# Patient Record
Sex: FEMALE | Race: WHITE | NOT HISPANIC OR LATINO | Employment: FULL TIME | URBAN - METROPOLITAN AREA
[De-identification: names, ages, dates, MRNs, and addresses within clinical notes are randomized per-mention and may not be internally consistent; named-entity substitution may affect disease eponyms.]

---

## 2019-03-14 LAB
EXTERNAL HEPATITIS B SURFACE ANTIGEN: NEGATIVE
EXTERNAL HEPATITIS C AB: NEGATIVE
EXTERNAL RUBELLA QUALITATIVE: NORMAL
EXTERNAL URINE DRUG SCREEN: NEGATIVE
HIV1 P24 AG SERPL QL IA: NORMAL

## 2019-10-02 ENCOUNTER — LAB (OUTPATIENT)
Dept: LAB | Facility: HOSPITAL | Age: 39
End: 2019-10-02

## 2019-10-02 ENCOUNTER — TRANSCRIBE ORDERS (OUTPATIENT)
Dept: LAB | Facility: HOSPITAL | Age: 39
End: 2019-10-02

## 2019-10-02 DIAGNOSIS — Z34.83 PRENATAL CARE, SUBSEQUENT PREGNANCY, THIRD TRIMESTER: ICD-10-CM

## 2019-10-02 DIAGNOSIS — Z34.83 PRENATAL CARE, SUBSEQUENT PREGNANCY, THIRD TRIMESTER: Primary | ICD-10-CM

## 2019-10-02 PROCEDURE — 87081 CULTURE SCREEN ONLY: CPT

## 2019-10-05 LAB — BACTERIA SPEC AEROBE CULT: NORMAL

## 2019-10-15 ENCOUNTER — PREP FOR SURGERY (OUTPATIENT)
Dept: OTHER | Facility: HOSPITAL | Age: 39
End: 2019-10-15

## 2019-10-15 DIAGNOSIS — O09.523 MULTIGRAVIDA OF ADVANCED MATERNAL AGE IN THIRD TRIMESTER: Primary | ICD-10-CM

## 2019-10-15 RX ORDER — PROMETHAZINE HYDROCHLORIDE 25 MG/ML
12.5 INJECTION, SOLUTION INTRAMUSCULAR; INTRAVENOUS EVERY 6 HOURS PRN
Status: CANCELLED | OUTPATIENT
Start: 2019-10-15

## 2019-10-15 RX ORDER — LIDOCAINE HYDROCHLORIDE 10 MG/ML
5 INJECTION, SOLUTION EPIDURAL; INFILTRATION; INTRACAUDAL; PERINEURAL AS NEEDED
Status: CANCELLED | OUTPATIENT
Start: 2019-10-15

## 2019-10-15 RX ORDER — PROMETHAZINE HYDROCHLORIDE 12.5 MG/1
12.5 TABLET ORAL EVERY 6 HOURS PRN
Status: CANCELLED | OUTPATIENT
Start: 2019-10-15

## 2019-10-15 RX ORDER — SODIUM CHLORIDE 0.9 % (FLUSH) 0.9 %
3 SYRINGE (ML) INJECTION EVERY 12 HOURS SCHEDULED
Status: CANCELLED | OUTPATIENT
Start: 2019-10-15

## 2019-10-15 RX ORDER — SODIUM CHLORIDE 0.9 % (FLUSH) 0.9 %
10 SYRINGE (ML) INJECTION AS NEEDED
Status: CANCELLED | OUTPATIENT
Start: 2019-10-15

## 2019-10-15 RX ORDER — CARBOPROST TROMETHAMINE 250 UG/ML
250 INJECTION, SOLUTION INTRAMUSCULAR AS NEEDED
Status: CANCELLED | OUTPATIENT
Start: 2019-10-15

## 2019-10-15 RX ORDER — OXYTOCIN-SODIUM CHLORIDE 0.9% IV SOLN 30 UNIT/500ML 30-0.9/5 UT/ML-%
85 SOLUTION INTRAVENOUS ONCE
Status: CANCELLED | OUTPATIENT
Start: 2019-10-15 | End: 2019-10-15

## 2019-10-15 RX ORDER — IBUPROFEN 600 MG/1
600 TABLET ORAL EVERY 6 HOURS PRN
Status: CANCELLED | OUTPATIENT
Start: 2019-10-15

## 2019-10-15 RX ORDER — SODIUM CHLORIDE, SODIUM LACTATE, POTASSIUM CHLORIDE, CALCIUM CHLORIDE 600; 310; 30; 20 MG/100ML; MG/100ML; MG/100ML; MG/100ML
125 INJECTION, SOLUTION INTRAVENOUS CONTINUOUS
Status: CANCELLED | OUTPATIENT
Start: 2019-10-15

## 2019-10-15 RX ORDER — METHYLERGONOVINE MALEATE 0.2 MG/ML
200 INJECTION INTRAVENOUS ONCE AS NEEDED
Status: CANCELLED | OUTPATIENT
Start: 2019-10-15

## 2019-10-15 RX ORDER — MAGNESIUM CARB/ALUMINUM HYDROX 105-160MG
30 TABLET,CHEWABLE ORAL ONCE
Status: CANCELLED | OUTPATIENT
Start: 2019-10-15 | End: 2019-10-15

## 2019-10-15 RX ORDER — OXYTOCIN-SODIUM CHLORIDE 0.9% IV SOLN 30 UNIT/500ML 30-0.9/5 UT/ML-%
650 SOLUTION INTRAVENOUS ONCE
Status: CANCELLED | OUTPATIENT
Start: 2019-10-15 | End: 2019-10-15

## 2019-10-15 RX ORDER — MORPHINE SULFATE 2 MG/ML
2 INJECTION, SOLUTION INTRAMUSCULAR; INTRAVENOUS
Status: CANCELLED | OUTPATIENT
Start: 2019-10-15 | End: 2019-10-25

## 2019-10-15 RX ORDER — PROMETHAZINE HYDROCHLORIDE 12.5 MG/1
12.5 SUPPOSITORY RECTAL EVERY 6 HOURS PRN
Status: CANCELLED | OUTPATIENT
Start: 2019-10-15

## 2019-10-15 RX ORDER — MISOPROSTOL 200 UG/1
800 TABLET ORAL AS NEEDED
Status: CANCELLED | OUTPATIENT
Start: 2019-10-15

## 2019-10-15 RX ORDER — OXYTOCIN-SODIUM CHLORIDE 0.9% IV SOLN 30 UNIT/500ML 30-0.9/5 UT/ML-%
2-30 SOLUTION INTRAVENOUS
Status: CANCELLED | OUTPATIENT
Start: 2019-10-15

## 2019-10-18 ENCOUNTER — ANESTHESIA EVENT (OUTPATIENT)
Dept: LABOR AND DELIVERY | Facility: HOSPITAL | Age: 39
End: 2019-10-18

## 2019-10-18 ENCOUNTER — ANESTHESIA (OUTPATIENT)
Dept: LABOR AND DELIVERY | Facility: HOSPITAL | Age: 39
End: 2019-10-18

## 2019-10-18 ENCOUNTER — HOSPITAL ENCOUNTER (INPATIENT)
Dept: LABOR AND DELIVERY | Facility: HOSPITAL | Age: 39
LOS: 2 days | Discharge: HOME | End: 2019-10-20
Attending: OBSTETRICS & GYNECOLOGY | Admitting: OBSTETRICS & GYNECOLOGY

## 2019-10-18 DIAGNOSIS — O09.523 MULTIGRAVIDA OF ADVANCED MATERNAL AGE IN THIRD TRIMESTER: ICD-10-CM

## 2019-10-18 PROBLEM — Z34.90 PREGNANT: Status: ACTIVE | Noted: 2019-10-18

## 2019-10-18 LAB
ABO GROUP BLD: NORMAL
ALP SERPL-CCNC: 110 U/L (ref 39–117)
ALT SERPL W P-5'-P-CCNC: 6 U/L (ref 1–33)
AST SERPL-CCNC: 12 U/L (ref 1–32)
BACTERIA UR QL AUTO: ABNORMAL /HPF
BILIRUB SERPL-MCNC: 0.5 MG/DL (ref 0.2–1.2)
BILIRUB UR QL STRIP: NEGATIVE
BLD GP AB SCN SERPL QL: NEGATIVE
CLARITY UR: ABNORMAL
COLOR UR: YELLOW
CREAT BLD-MCNC: 0.57 MG/DL (ref 0.57–1)
DEPRECATED RDW RBC AUTO: 40.4 FL (ref 37–54)
ERYTHROCYTE [DISTWIDTH] IN BLOOD BY AUTOMATED COUNT: 13 % (ref 12.3–15.4)
GLUCOSE UR STRIP-MCNC: NEGATIVE MG/DL
HCT VFR BLD AUTO: 35.4 % (ref 34–46.6)
HGB BLD-MCNC: 11.8 G/DL (ref 12–15.9)
HGB UR QL STRIP.AUTO: ABNORMAL
HYALINE CASTS UR QL AUTO: ABNORMAL /LPF
KETONES UR QL STRIP: NEGATIVE
LDH SERPL-CCNC: 187 U/L (ref 135–214)
LEUKOCYTE ESTERASE UR QL STRIP.AUTO: ABNORMAL
MCH RBC QN AUTO: 28.8 PG (ref 26.6–33)
MCHC RBC AUTO-ENTMCNC: 33.3 G/DL (ref 31.5–35.7)
MCV RBC AUTO: 86.3 FL (ref 79–97)
NITRITE UR QL STRIP: NEGATIVE
PH UR STRIP.AUTO: 8 [PH] (ref 5–8)
PLATELET # BLD AUTO: 197 10*3/MM3 (ref 140–450)
PMV BLD AUTO: 10.2 FL (ref 6–12)
PROT UR QL STRIP: ABNORMAL
RBC # BLD AUTO: 4.1 10*6/MM3 (ref 3.77–5.28)
RBC # UR: ABNORMAL /HPF
REF LAB TEST METHOD: ABNORMAL
RH BLD: POSITIVE
SP GR UR STRIP: 1.01 (ref 1–1.03)
SQUAMOUS #/AREA URNS HPF: ABNORMAL /HPF
T&S EXPIRATION DATE: NORMAL
URATE SERPL-MCNC: 3.2 MG/DL (ref 2.4–5.7)
UROBILINOGEN UR QL STRIP: ABNORMAL
WBC NRBC COR # BLD: 8.55 10*3/MM3 (ref 3.4–10.8)
WBC UR QL AUTO: ABNORMAL /HPF

## 2019-10-18 PROCEDURE — 25010000002 ROPIVACAINE PER 1 MG: Performed by: NURSE ANESTHETIST, CERTIFIED REGISTERED

## 2019-10-18 PROCEDURE — 83615 LACTATE (LD) (LDH) ENZYME: CPT | Performed by: OBSTETRICS & GYNECOLOGY

## 2019-10-18 PROCEDURE — 86900 BLOOD TYPING SEROLOGIC ABO: CPT | Performed by: OBSTETRICS & GYNECOLOGY

## 2019-10-18 PROCEDURE — 84075 ASSAY ALKALINE PHOSPHATASE: CPT | Performed by: OBSTETRICS & GYNECOLOGY

## 2019-10-18 PROCEDURE — 86850 RBC ANTIBODY SCREEN: CPT | Performed by: OBSTETRICS & GYNECOLOGY

## 2019-10-18 PROCEDURE — 86901 BLOOD TYPING SEROLOGIC RH(D): CPT | Performed by: OBSTETRICS & GYNECOLOGY

## 2019-10-18 PROCEDURE — 87086 URINE CULTURE/COLONY COUNT: CPT | Performed by: OBSTETRICS & GYNECOLOGY

## 2019-10-18 PROCEDURE — 82247 BILIRUBIN TOTAL: CPT | Performed by: OBSTETRICS & GYNECOLOGY

## 2019-10-18 PROCEDURE — 59025 FETAL NON-STRESS TEST: CPT

## 2019-10-18 PROCEDURE — 36415 COLL VENOUS BLD VENIPUNCTURE: CPT | Performed by: OBSTETRICS & GYNECOLOGY

## 2019-10-18 PROCEDURE — 10907ZC DRAINAGE OF AMNIOTIC FLUID, THERAPEUTIC FROM PRODUCTS OF CONCEPTION, VIA NATURAL OR ARTIFICIAL OPENING: ICD-10-PCS | Performed by: OBSTETRICS & GYNECOLOGY

## 2019-10-18 PROCEDURE — 85027 COMPLETE CBC AUTOMATED: CPT | Performed by: OBSTETRICS & GYNECOLOGY

## 2019-10-18 PROCEDURE — C1755 CATHETER, INTRASPINAL: HCPCS

## 2019-10-18 PROCEDURE — C1755 CATHETER, INTRASPINAL: HCPCS | Performed by: ANESTHESIOLOGY

## 2019-10-18 PROCEDURE — 84460 ALANINE AMINO (ALT) (SGPT): CPT | Performed by: OBSTETRICS & GYNECOLOGY

## 2019-10-18 PROCEDURE — 0KQM0ZZ REPAIR PERINEUM MUSCLE, OPEN APPROACH: ICD-10-PCS | Performed by: OBSTETRICS & GYNECOLOGY

## 2019-10-18 PROCEDURE — 25010000002 FENTANYL CITRATE (PF) 100 MCG/2ML SOLUTION: Performed by: NURSE ANESTHETIST, CERTIFIED REGISTERED

## 2019-10-18 PROCEDURE — 84550 ASSAY OF BLOOD/URIC ACID: CPT | Performed by: OBSTETRICS & GYNECOLOGY

## 2019-10-18 PROCEDURE — 81001 URINALYSIS AUTO W/SCOPE: CPT | Performed by: OBSTETRICS & GYNECOLOGY

## 2019-10-18 PROCEDURE — 82565 ASSAY OF CREATININE: CPT | Performed by: OBSTETRICS & GYNECOLOGY

## 2019-10-18 PROCEDURE — 3E033VJ INTRODUCTION OF OTHER HORMONE INTO PERIPHERAL VEIN, PERCUTANEOUS APPROACH: ICD-10-PCS | Performed by: OBSTETRICS & GYNECOLOGY

## 2019-10-18 PROCEDURE — 84450 TRANSFERASE (AST) (SGOT): CPT | Performed by: OBSTETRICS & GYNECOLOGY

## 2019-10-18 RX ORDER — METHYLERGONOVINE MALEATE 0.2 MG/ML
200 INJECTION INTRAVENOUS ONCE AS NEEDED
Status: DISCONTINUED | OUTPATIENT
Start: 2019-10-18 | End: 2019-10-18 | Stop reason: HOSPADM

## 2019-10-18 RX ORDER — ONDANSETRON 2 MG/ML
4 INJECTION INTRAMUSCULAR; INTRAVENOUS ONCE AS NEEDED
Status: DISCONTINUED | OUTPATIENT
Start: 2019-10-18 | End: 2019-10-18 | Stop reason: HOSPADM

## 2019-10-18 RX ORDER — LIDOCAINE HYDROCHLORIDE 10 MG/ML
5 INJECTION, SOLUTION EPIDURAL; INFILTRATION; INTRACAUDAL; PERINEURAL AS NEEDED
Status: DISCONTINUED | OUTPATIENT
Start: 2019-10-18 | End: 2019-10-18 | Stop reason: HOSPADM

## 2019-10-18 RX ORDER — LEVOFLOXACIN 500 MG/1
500 TABLET, FILM COATED ORAL EVERY 24 HOURS
Status: DISCONTINUED | OUTPATIENT
Start: 2019-10-18 | End: 2019-10-19

## 2019-10-18 RX ORDER — SODIUM CHLORIDE, SODIUM LACTATE, POTASSIUM CHLORIDE, CALCIUM CHLORIDE 600; 310; 30; 20 MG/100ML; MG/100ML; MG/100ML; MG/100ML
125 INJECTION, SOLUTION INTRAVENOUS CONTINUOUS
Status: DISCONTINUED | OUTPATIENT
Start: 2019-10-18 | End: 2019-10-19

## 2019-10-18 RX ORDER — DOCUSATE SODIUM 100 MG/1
100 CAPSULE, LIQUID FILLED ORAL 2 TIMES DAILY
Status: DISCONTINUED | OUTPATIENT
Start: 2019-10-18 | End: 2019-10-20 | Stop reason: HOSPADM

## 2019-10-18 RX ORDER — DIPHENHYDRAMINE HYDROCHLORIDE 50 MG/ML
12.5 INJECTION INTRAMUSCULAR; INTRAVENOUS EVERY 8 HOURS PRN
Status: DISCONTINUED | OUTPATIENT
Start: 2019-10-18 | End: 2019-10-18 | Stop reason: HOSPADM

## 2019-10-18 RX ORDER — MISOPROSTOL 200 UG/1
800 TABLET ORAL AS NEEDED
Status: DISCONTINUED | OUTPATIENT
Start: 2019-10-18 | End: 2019-10-18 | Stop reason: HOSPADM

## 2019-10-18 RX ORDER — ONDANSETRON 2 MG/ML
4 INJECTION INTRAMUSCULAR; INTRAVENOUS EVERY 6 HOURS PRN
Status: DISCONTINUED | OUTPATIENT
Start: 2019-10-18 | End: 2019-10-20 | Stop reason: HOSPADM

## 2019-10-18 RX ORDER — ONDANSETRON 4 MG/1
4 TABLET, FILM COATED ORAL EVERY 6 HOURS PRN
Status: DISCONTINUED | OUTPATIENT
Start: 2019-10-18 | End: 2019-10-20 | Stop reason: HOSPADM

## 2019-10-18 RX ORDER — PROMETHAZINE HYDROCHLORIDE 12.5 MG/1
12.5 TABLET ORAL EVERY 6 HOURS PRN
Status: DISCONTINUED | OUTPATIENT
Start: 2019-10-18 | End: 2019-10-18 | Stop reason: HOSPADM

## 2019-10-18 RX ORDER — PROMETHAZINE HYDROCHLORIDE 25 MG/ML
12.5 INJECTION, SOLUTION INTRAMUSCULAR; INTRAVENOUS EVERY 6 HOURS PRN
Status: DISCONTINUED | OUTPATIENT
Start: 2019-10-18 | End: 2019-10-18 | Stop reason: HOSPADM

## 2019-10-18 RX ORDER — MORPHINE SULFATE 2 MG/ML
2 INJECTION, SOLUTION INTRAMUSCULAR; INTRAVENOUS
Status: DISCONTINUED | OUTPATIENT
Start: 2019-10-18 | End: 2019-10-18 | Stop reason: HOSPADM

## 2019-10-18 RX ORDER — LIDOCAINE HYDROCHLORIDE AND EPINEPHRINE 15; 5 MG/ML; UG/ML
INJECTION, SOLUTION EPIDURAL AS NEEDED
Status: DISCONTINUED | OUTPATIENT
Start: 2019-10-18 | End: 2019-10-18 | Stop reason: SURG

## 2019-10-18 RX ORDER — MAGNESIUM CARB/ALUMINUM HYDROX 105-160MG
30 TABLET,CHEWABLE ORAL ONCE
Status: DISCONTINUED | OUTPATIENT
Start: 2019-10-18 | End: 2019-10-18 | Stop reason: HOSPADM

## 2019-10-18 RX ORDER — PROMETHAZINE HYDROCHLORIDE 12.5 MG/1
12.5 SUPPOSITORY RECTAL EVERY 6 HOURS PRN
Status: DISCONTINUED | OUTPATIENT
Start: 2019-10-18 | End: 2019-10-18 | Stop reason: HOSPADM

## 2019-10-18 RX ORDER — IBUPROFEN 600 MG/1
600 TABLET ORAL EVERY 6 HOURS PRN
Status: DISCONTINUED | OUTPATIENT
Start: 2019-10-18 | End: 2019-10-18 | Stop reason: HOSPADM

## 2019-10-18 RX ORDER — ROPIVACAINE HYDROCHLORIDE 2 MG/ML
15 INJECTION, SOLUTION EPIDURAL; INFILTRATION; PERINEURAL CONTINUOUS
Status: DISCONTINUED | OUTPATIENT
Start: 2019-10-18 | End: 2019-10-19

## 2019-10-18 RX ORDER — FENTANYL CITRATE 50 UG/ML
INJECTION, SOLUTION INTRAMUSCULAR; INTRAVENOUS AS NEEDED
Status: DISCONTINUED | OUTPATIENT
Start: 2019-10-18 | End: 2019-10-18 | Stop reason: SURG

## 2019-10-18 RX ORDER — FAMOTIDINE 10 MG/ML
20 INJECTION, SOLUTION INTRAVENOUS ONCE AS NEEDED
Status: DISCONTINUED | OUTPATIENT
Start: 2019-10-18 | End: 2019-10-18 | Stop reason: HOSPADM

## 2019-10-18 RX ORDER — PRENATAL WITH FERROUS FUM AND FOLIC ACID 3080; 920; 120; 400; 22; 1.84; 3; 20; 10; 1; 12; 200; 27; 25; 2 [IU]/1; [IU]/1; MG/1; [IU]/1; MG/1; MG/1; MG/1; MG/1; MG/1; MG/1; UG/1; MG/1; MG/1; MG/1; MG/1
1 TABLET ORAL DAILY
COMMUNITY
End: 2021-02-22

## 2019-10-18 RX ORDER — CLINDAMYCIN PHOSPHATE 900 MG/50ML
900 INJECTION INTRAVENOUS ONCE
Status: COMPLETED | OUTPATIENT
Start: 2019-10-18 | End: 2019-10-18

## 2019-10-18 RX ORDER — BISACODYL 10 MG
10 SUPPOSITORY, RECTAL RECTAL DAILY PRN
Status: DISCONTINUED | OUTPATIENT
Start: 2019-10-19 | End: 2019-10-20 | Stop reason: HOSPADM

## 2019-10-18 RX ORDER — PROMETHAZINE HYDROCHLORIDE 12.5 MG/1
12.5 TABLET ORAL EVERY 4 HOURS PRN
Status: DISCONTINUED | OUTPATIENT
Start: 2019-10-18 | End: 2019-10-20 | Stop reason: HOSPADM

## 2019-10-18 RX ORDER — OXYTOCIN-SODIUM CHLORIDE 0.9% IV SOLN 30 UNIT/500ML 30-0.9/5 UT/ML-%
85 SOLUTION INTRAVENOUS ONCE
Status: COMPLETED | OUTPATIENT
Start: 2019-10-18 | End: 2019-10-18

## 2019-10-18 RX ORDER — OXYTOCIN-SODIUM CHLORIDE 0.9% IV SOLN 30 UNIT/500ML 30-0.9/5 UT/ML-%
650 SOLUTION INTRAVENOUS ONCE
Status: DISCONTINUED | OUTPATIENT
Start: 2019-10-18 | End: 2019-10-18 | Stop reason: HOSPADM

## 2019-10-18 RX ORDER — CARBOPROST TROMETHAMINE 250 UG/ML
250 INJECTION, SOLUTION INTRAMUSCULAR AS NEEDED
Status: DISCONTINUED | OUTPATIENT
Start: 2019-10-18 | End: 2019-10-18 | Stop reason: HOSPADM

## 2019-10-18 RX ORDER — IBUPROFEN 600 MG/1
600 TABLET ORAL EVERY 6 HOURS PRN
Status: DISCONTINUED | OUTPATIENT
Start: 2019-10-18 | End: 2019-10-20 | Stop reason: HOSPADM

## 2019-10-18 RX ORDER — SODIUM CHLORIDE 0.9 % (FLUSH) 0.9 %
3 SYRINGE (ML) INJECTION EVERY 12 HOURS SCHEDULED
Status: DISCONTINUED | OUTPATIENT
Start: 2019-10-18 | End: 2019-10-18 | Stop reason: HOSPADM

## 2019-10-18 RX ORDER — OXYTOCIN-SODIUM CHLORIDE 0.9% IV SOLN 30 UNIT/500ML 30-0.9/5 UT/ML-%
2-30 SOLUTION INTRAVENOUS
Status: DISCONTINUED | OUTPATIENT
Start: 2019-10-18 | End: 2019-10-18 | Stop reason: HOSPADM

## 2019-10-18 RX ORDER — LANOLIN
CREAM (ML) TOPICAL AS NEEDED
Status: DISCONTINUED | OUTPATIENT
Start: 2019-10-18 | End: 2019-10-20 | Stop reason: HOSPADM

## 2019-10-18 RX ORDER — TRISODIUM CITRATE DIHYDRATE AND CITRIC ACID MONOHYDRATE 500; 334 MG/5ML; MG/5ML
30 SOLUTION ORAL ONCE
Status: DISCONTINUED | OUTPATIENT
Start: 2019-10-18 | End: 2019-10-18 | Stop reason: HOSPADM

## 2019-10-18 RX ORDER — METOCLOPRAMIDE HYDROCHLORIDE 5 MG/ML
10 INJECTION INTRAMUSCULAR; INTRAVENOUS ONCE AS NEEDED
Status: DISCONTINUED | OUTPATIENT
Start: 2019-10-18 | End: 2019-10-18 | Stop reason: HOSPADM

## 2019-10-18 RX ORDER — CETIRIZINE HYDROCHLORIDE 5 MG/1
5 TABLET ORAL DAILY
COMMUNITY
End: 2019-10-20 | Stop reason: HOSPADM

## 2019-10-18 RX ORDER — SODIUM CHLORIDE 0.9 % (FLUSH) 0.9 %
1-10 SYRINGE (ML) INJECTION AS NEEDED
Status: DISCONTINUED | OUTPATIENT
Start: 2019-10-18 | End: 2019-10-20 | Stop reason: HOSPADM

## 2019-10-18 RX ORDER — EPHEDRINE SULFATE/0.9% NACL/PF 25 MG/5 ML
10 SYRINGE (ML) INTRAVENOUS
Status: DISCONTINUED | OUTPATIENT
Start: 2019-10-18 | End: 2019-10-18 | Stop reason: HOSPADM

## 2019-10-18 RX ORDER — PROMETHAZINE HYDROCHLORIDE 25 MG/ML
12.5 INJECTION, SOLUTION INTRAMUSCULAR; INTRAVENOUS EVERY 4 HOURS PRN
Status: DISCONTINUED | OUTPATIENT
Start: 2019-10-18 | End: 2019-10-20 | Stop reason: HOSPADM

## 2019-10-18 RX ORDER — SODIUM CHLORIDE 0.9 % (FLUSH) 0.9 %
10 SYRINGE (ML) INJECTION AS NEEDED
Status: DISCONTINUED | OUTPATIENT
Start: 2019-10-18 | End: 2019-10-18 | Stop reason: HOSPADM

## 2019-10-18 RX ADMIN — CLINDAMYCIN PHOSPHATE 900 MG: 900 INJECTION, SOLUTION INTRAVENOUS at 15:32

## 2019-10-18 RX ADMIN — SODIUM CHLORIDE, POTASSIUM CHLORIDE, SODIUM LACTATE AND CALCIUM CHLORIDE 125 ML/HR: 600; 310; 30; 20 INJECTION, SOLUTION INTRAVENOUS at 11:56

## 2019-10-18 RX ADMIN — FENTANYL CITRATE 100 MCG: 50 INJECTION, SOLUTION INTRAMUSCULAR; INTRAVENOUS at 11:38

## 2019-10-18 RX ADMIN — HYDROCORTISONE 2.5% 1 APPLICATION: 25 CREAM TOPICAL at 21:06

## 2019-10-18 RX ADMIN — IBUPROFEN 600 MG: 600 TABLET ORAL at 21:06

## 2019-10-18 RX ADMIN — SODIUM CHLORIDE, POTASSIUM CHLORIDE, SODIUM LACTATE AND CALCIUM CHLORIDE 125 ML/HR: 600; 310; 30; 20 INJECTION, SOLUTION INTRAVENOUS at 06:33

## 2019-10-18 RX ADMIN — LIDOCAINE HYDROCHLORIDE AND EPINEPHRINE 2 ML: 15; 5 INJECTION, SOLUTION EPIDURAL at 11:38

## 2019-10-18 RX ADMIN — LIDOCAINE HYDROCHLORIDE AND EPINEPHRINE 3 ML: 15; 5 INJECTION, SOLUTION EPIDURAL at 11:35

## 2019-10-18 RX ADMIN — SODIUM CHLORIDE, POTASSIUM CHLORIDE, SODIUM LACTATE AND CALCIUM CHLORIDE 3000 ML/HR: 600; 310; 30; 20 INJECTION, SOLUTION INTRAVENOUS at 11:06

## 2019-10-18 RX ADMIN — OXYTOCIN 85 ML/HR: 10 INJECTION, SOLUTION INTRAMUSCULAR; INTRAVENOUS at 18:46

## 2019-10-18 RX ADMIN — SODIUM CHLORIDE, POTASSIUM CHLORIDE, SODIUM LACTATE AND CALCIUM CHLORIDE 125 ML/HR: 600; 310; 30; 20 INJECTION, SOLUTION INTRAVENOUS at 09:21

## 2019-10-18 RX ADMIN — OXYTOCIN 2 MILLI-UNITS/MIN: 10 INJECTION, SOLUTION INTRAMUSCULAR; INTRAVENOUS at 07:45

## 2019-10-18 RX ADMIN — ROPIVACAINE HYDROCHLORIDE 16 ML/HR: 2 INJECTION, SOLUTION EPIDURAL; INFILTRATION at 11:44

## 2019-10-18 RX ADMIN — Medication: at 21:06

## 2019-10-18 RX ADMIN — ROPIVACAINE HYDROCHLORIDE 10 ML: 5 INJECTION, SOLUTION EPIDURAL; INFILTRATION; PERINEURAL at 11:40

## 2019-10-18 RX ADMIN — WITCH HAZEL 1 PAD: 500 SOLUTION RECTAL; TOPICAL at 21:06

## 2019-10-18 RX ADMIN — DOCUSATE SODIUM 100 MG: 100 CAPSULE, LIQUID FILLED ORAL at 21:06

## 2019-10-18 RX ADMIN — LEVOFLOXACIN 500 MG: 500 TABLET, FILM COATED ORAL at 21:54

## 2019-10-18 NOTE — H&P
"Subjective     Chief Complaint   Patient presents with   • Scheduled Induction       Charis Bull is a 39 y.o. year old  with an Estimated Date of Delivery: 10/23/19 currently at 39w2d presenting with for induction of labor for favorable cervix at term.  She currently reports no complaints    Prenatal care has been with Dr. pritchett.  It has been significant for AMA (genetic screening was declined).    Risks of labor induction including prolongation of labor, increased risks for both  section and operative vaginal birth have been discussed at length.     No Additional Complaints Reported    The following portions of the patient's history were reviewed and updated as appropriate:vital signs, allergies, current medications, past medical history, past social history, past surgical history and problem list.      Objective     /76   Pulse 106   Temp 99.1 °F (37.3 °C) (Oral)   Resp 16   Ht 175.3 cm (69\")   Wt 99.8 kg (220 lb)   Breastfeeding? Yes   BMI 32.49 kg/m²     Physical Exam    General:  well developed; well nourished  no acute distress           Abdomen: soft, non-tender; no masses  no umbilical or inguinal hernias are present  no hepato-splenomegaly  fundus firm and non-tender       FHT's: reactive and category 1   Cervix: 2 cm / 50% effaced  / -3 station     Assessment/Plan     ASSESSMENT  1. IUP at 39w2d  2. Induction of labor indication: favorable cervix at term  3. Group B strep status: negative    PLAN  1. Pitocin, arom         Marianna Pritchett MD  10/18/2019  "

## 2019-10-18 NOTE — L&D DELIVERY NOTE
10/18/2019    Patient:Charis Bull    MR#:[id number]    Vaginal Delivery Note  39 y.o. yo female  at 39w2d    Patient Active Problem List   Diagnosis   • Pregnant       Delivery     Delivery: Vaginal, Spontaneous     YOB: 2019    Time of Birth: 5:14 PM      Anesthesia: Epidural     Delivering clinician: Marianna Pritchett    Forceps?   No   Vacuum? No    Shoulder dystocia present: No          Infant    Findings: male  infant     Infant observations: Weight: 3490 g (7 lb 11.1 oz)     Observations/Comments:         Apgars:    @ 1 minute /       @ 5 minutes         Placenta, Cord, and Fluid    Placenta delivered  Spontaneous  at  10/18/2019  5:21 PM     Cord: 3 vessels  present.   Nuchal Cord?  no   Cord blood obtained: Yes    Cord gases obtained:  No    Cord gas results: Pending         Repair    Episiotomy: No   Lacerations: Yes  Laceration Information  Laceration Repaired?   Perineal: 2nd  Yes    Periurethral:         Labial:         Sulcus:         Vaginal: No       Cervical: No          Suture used for repair: 2-0 Vicryl   Estimated Blood Loss:   100 mls.       Examined inside uterus, cervix intact, I/O catheter of bladder and urine still clear, vaginal walls examined an no masses, or fistulas seen.       Complications  none    Disposition  Mother to Mother Baby/Postpartum  in stable condition currently.  Baby to remains with mom  in stable condition currently.                Marianna Pritchett MD  10/18/19  5:38 PM

## 2019-10-18 NOTE — PLAN OF CARE
Problem: Labor (Cervical Ripen, Induct, Augment) (Adult,Obstetrics,Pediatric)  Goal: Signs and Symptoms of Listed Potential Problems Will be Absent, Minimized or Managed (Labor)  Outcome: Outcome(s) achieved Date Met: 10/18/19

## 2019-10-18 NOTE — ANESTHESIA PROCEDURE NOTES
Labor Epidural      Patient reassessed immediately prior to procedure    Patient location during procedure: floor  Performed By  Anesthesiologist: Brent Thompson MD  CRNA: Catalina Foster CRNA  Preanesthetic Checklist  Completed: patient identified, surgical consent, pre-op evaluation, timeout performed, IV checked, risks and benefits discussed and monitors and equipment checked  Additional Notes  Attempt at two different levels.  Heme x 2 at L3/L4.  Placed at L4/L5.   Prep:  Pt Position:sitting  Sterile Tech:cap, gloves, mask and sterile barrier  Prep:DuraPrep  Monitoring:blood pressure monitoring  Epidural Block Procedure:  Approach:midline  Guidance:landmark technique and palpation technique  Location:L3-L4  Needle Type:Tuohy  Needle Gauge:17 G  Loss of Resistance Medium: air  Loss of Resistance: 5cm  Cath Depth at skin:10 cm  Aspiration:negative  Test Dose:negative  Number of Attempts: 2  Post Assessment:  Dressing:occlusive dressing applied and secured with tape  Pt Tolerance:patient tolerated the procedure well with no apparent complications  Complications:no

## 2019-10-18 NOTE — ANESTHESIA PREPROCEDURE EVALUATION
Anesthesia Evaluation     Patient summary reviewed and Nursing notes reviewed   NPO Solid Status: > 6 hours  NPO Liquid Status: > 6 hours           Airway   Mallampati: I  TM distance: >3 FB  Neck ROM: full  Dental      Pulmonary - negative pulmonary ROS   Cardiovascular - negative cardio ROS        Neuro/Psych- negative ROS  GI/Hepatic/Renal/Endo - negative ROS     Musculoskeletal     (+) back pain (s /p fall today at home before coming to the hospital.  Low back pain/bruising/tendernes upon palpation.  Denies radiulopathy),   Arthralgias: s.  Abdominal    Substance History - negative use     OB/GYN    (+) Pregnant,         Other - negative ROS                     Anesthesia Plan    ASA 2     epidural     Anesthetic plan, all risks, benefits, and alternatives have been provided, discussed and informed consent has been obtained with: patient.

## 2019-10-18 NOTE — PROGRESS NOTES
CE now 4/80/-2, baby moving down appropriately in pelvis. IUPC placed without difficulty. Cat 1 tracing.   While performing exam and placing iupc, it was noticed there was small amount of thick white discharge on glove vaginally, and then bladder catheter tubing filled with white discharge as well. CCUA and culture collected. Her wbc is 8 and she is afebrile. Her urine was clear when catheter first placed after epidural. She has had no symptoms of infection. Uncertain what this is, possible rupture of vaginal cyst, uncertain if could have had walled off abscess in bladder that ruptured, and spilled around catheter, or possible fistula (but she has had no symptoms of this prior) or other. If CCUA looks like infection will treat with abx for now. Plan thorough exam after delivery. Uncertain if imaging appropriate at some point.

## 2019-10-19 LAB
BACTERIA SPEC AEROBE CULT: NO GROWTH
BASOPHILS # BLD AUTO: 0.02 10*3/MM3 (ref 0–0.2)
BASOPHILS NFR BLD AUTO: 0.3 % (ref 0–1.5)
DEPRECATED RDW RBC AUTO: 41.2 FL (ref 37–54)
EOSINOPHIL # BLD AUTO: 0.07 10*3/MM3 (ref 0–0.4)
EOSINOPHIL NFR BLD AUTO: 1 % (ref 0.3–6.2)
ERYTHROCYTE [DISTWIDTH] IN BLOOD BY AUTOMATED COUNT: 13.3 % (ref 12.3–15.4)
HCT VFR BLD AUTO: 33 % (ref 34–46.6)
HGB BLD-MCNC: 10.8 G/DL (ref 12–15.9)
IMM GRANULOCYTES # BLD AUTO: 0.05 10*3/MM3 (ref 0–0.05)
IMM GRANULOCYTES NFR BLD AUTO: 0.7 % (ref 0–0.5)
LYMPHOCYTES # BLD AUTO: 0.99 10*3/MM3 (ref 0.7–3.1)
LYMPHOCYTES NFR BLD AUTO: 13.6 % (ref 19.6–45.3)
MCH RBC QN AUTO: 28.6 PG (ref 26.6–33)
MCHC RBC AUTO-ENTMCNC: 32.7 G/DL (ref 31.5–35.7)
MCV RBC AUTO: 87.5 FL (ref 79–97)
MONOCYTES # BLD AUTO: 0.58 10*3/MM3 (ref 0.1–0.9)
MONOCYTES NFR BLD AUTO: 8 % (ref 5–12)
NEUTROPHILS # BLD AUTO: 5.55 10*3/MM3 (ref 1.7–7)
NEUTROPHILS NFR BLD AUTO: 76.4 % (ref 42.7–76)
NRBC BLD AUTO-RTO: 0 /100 WBC (ref 0–0.2)
PLATELET # BLD AUTO: 160 10*3/MM3 (ref 140–450)
PMV BLD AUTO: 10.3 FL (ref 6–12)
RBC # BLD AUTO: 3.77 10*6/MM3 (ref 3.77–5.28)
WBC NRBC COR # BLD: 7.26 10*3/MM3 (ref 3.4–10.8)

## 2019-10-19 PROCEDURE — 85025 COMPLETE CBC W/AUTO DIFF WBC: CPT | Performed by: OBSTETRICS & GYNECOLOGY

## 2019-10-19 RX ADMIN — DOCUSATE SODIUM 100 MG: 100 CAPSULE, LIQUID FILLED ORAL at 21:06

## 2019-10-19 RX ADMIN — IBUPROFEN 600 MG: 600 TABLET ORAL at 12:24

## 2019-10-19 RX ADMIN — Medication: at 02:13

## 2019-10-19 RX ADMIN — IBUPROFEN 600 MG: 600 TABLET ORAL at 06:08

## 2019-10-19 RX ADMIN — DOCUSATE SODIUM 100 MG: 100 CAPSULE, LIQUID FILLED ORAL at 08:36

## 2019-10-19 RX ADMIN — IBUPROFEN 600 MG: 600 TABLET ORAL at 18:39

## 2019-10-19 NOTE — PROGRESS NOTES
10/19/2019  PPD #1    Subjective   Charis feels well.  Patient describes her lochia less than menses.  Pain is well controlled  She denies painful urination, retention.       Objective   Temp: Temp:  [97.7 °F (36.5 °C)-99.2 °F (37.3 °C)] 97.9 °F (36.6 °C) Temp src: Oral   BP: BP: (107-141)/(58-92) 134/77        Pulse: Heart Rate:  [] 88  RR: Resp:  [16-18] 16    General:  No acute distress   Abdomen: Fundus firm and beneath umbilicus   Pelvis: deferred           Lab Results   Component Value Date    WBC 8.55 10/18/2019    HGB 11.8 (L) 10/18/2019    HCT 35.4 10/18/2019    MCV 86.3 10/18/2019     10/18/2019    ABORH O Rh Positive 02/17/2016    HEPBSAG Negative 03/14/2019       Assessment  1. PPD# 1 after vaginal delivery; today's labs pending  2. Blistered area on abdomen resolving  3. No problems urinating; continue Levaquin.  4. Baby boy; plan circ    Plan  1. Routine postpartum care.      This note has been electronically signed.    Rafaela Melendez, APRN  October 19, 2019

## 2019-10-19 NOTE — SIGNIFICANT NOTE
Small, circular, red, blistered area noted on right middle abdomen above umbilicus. Pt. states that is where toco monitor was placed. Resource RN brought in room to assess. Pt. denies any discomfort.

## 2019-10-19 NOTE — PLAN OF CARE
Problem: Patient Care Overview  Goal: Plan of Care Review  Outcome: Ongoing (interventions implemented as appropriate)   10/19/19 0835   Coping/Psychosocial   Plan of Care Reviewed With patient;spouse   Plan of Care Review   Progress no change   OTHER   Outcome Summary Patient reports breastfeeding is going well. She said she successfully  her first 2 children for 8-10 months. She said she has a home breast pump.       Problem: Breastfeeding (Adult,Obstetrics,Pediatric)  Intervention: Support Exclusive Breastfeeding Success   10/19/19 0835   Reproductive Interventions   Breastfeeding Assistance feeding cue recognition promoted;feeding on demand promoted;support offered

## 2019-10-19 NOTE — PLAN OF CARE
Problem: Patient Care Overview  Goal: Plan of Care Review  Outcome: Ongoing (interventions implemented as appropriate)   10/19/19 1507   Coping/Psychosocial   Plan of Care Reviewed With patient;spouse   Plan of Care Review   Progress improving   OTHER   Outcome Summary VSS, ambulating well, breastfeeding is going well, pain controlled      Goal: Individualization and Mutuality  Outcome: Ongoing (interventions implemented as appropriate)   10/19/19 1507   Individualization   Patient Specific Preferences breastfeeding    Patient Specific Goals (Include Timeframe) dc tomorrow with baby   Patient Specific Interventions rooming in    Mutuality/Individual Preferences   What Anxieties, Fears, Concerns, or Questions Do You Have About Your Care? none   What Information Would Help Us Give You More Personalized Care? 3rd child    How Would You and/or Your Support Person Like to Participate in Your Care? support   Mutuality/Individual Preferences   How to Address Anxieties/Fears na     Goal: Discharge Needs Assessment  Outcome: Ongoing (interventions implemented as appropriate)   10/19/19 1507   Discharge Needs Assessment   Readmission Within the Last 30 Days no previous admission in last 30 days   Concerns to be Addressed no discharge needs identified   Patient/Family Anticipates Transition to home with family;home   Patient/Family Anticipated Services at Transition none   Transportation Concerns car, none   Transportation Anticipated family or friend will provide;car, drives self   Anticipated Changes Related to Illness none   Equipment Needed After Discharge none     Goal: Interprofessional Rounds/Family Conf  Outcome: Ongoing (interventions implemented as appropriate)   10/19/19 1507   Interdisciplinary Rounds/Family Conf   Participants family;nursing;pat   10/19/19 1507   Interdisciplinary Rounds/Family Conf   Participants family;nursing;patient   ient       Problem: Postpartum (Vaginal Delivery)  (Adult,Obstetrics,Pediatric)  Goal: Signs and Symptoms of Listed Potential Problems Will be Absent, Minimized or Managed (Postpartum)  Outcome: Ongoing (interventions implemented as appropriate)   10/19/19 1047   Goal/Outcome Evaluation   Problems Assessed (Postpartum Vaginal Delivery) all   Problems Present (Postpartum Vag Deliv) none

## 2019-10-19 NOTE — SIGNIFICANT NOTE
Call to Dr Bailon to update, Urine culture negative. Received return call after, after Dr Bailon conferred with Dr Pritchett.  Order to gaston Cordero.     RB

## 2019-10-19 NOTE — ANESTHESIA POSTPROCEDURE EVALUATION
Patient: Charis Bull    Procedure Summary     Date:  10/18/19 Room / Location:      Anesthesia Start:  1111 Anesthesia Stop:  1514    Procedure:  LABOR ANALGESIA Diagnosis:      Scheduled Providers:   Provider:  Brent Thompson MD    Anesthesia Type:  epidural ASA Status:  2          Anesthesia Type: epidural  Last vitals  BP   134/77 (10/19/19 0700)   Temp   97.9 °F (36.6 °C) (10/19/19 0700)   Pulse   88 (10/19/19 0700)   Resp   16 (10/19/19 0700)     SpO2   100 % (10/18/19 1152)     Post Anesthesia Care and Evaluation    Patient location during evaluation: bedside  Patient participation: complete - patient participated  Level of consciousness: awake  Pain score: 0  Pain management: satisfactory to patient  Airway patency: patent  Anesthetic complications: No anesthetic complications  PONV Status: none  Cardiovascular status: acceptable and hemodynamically stable  Respiratory status: acceptable  Hydration status: acceptable  Post Neuraxial Block status: Motor and sensory function returned to baseline and No signs or symptoms of PDPH

## 2019-10-20 VITALS
OXYGEN SATURATION: 100 % | DIASTOLIC BLOOD PRESSURE: 78 MMHG | BODY MASS INDEX: 32.58 KG/M2 | SYSTOLIC BLOOD PRESSURE: 134 MMHG | HEIGHT: 69 IN | WEIGHT: 220 LBS | RESPIRATION RATE: 16 BRPM | HEART RATE: 93 BPM | TEMPERATURE: 98.1 F

## 2019-10-20 RX ORDER — IBUPROFEN 600 MG/1
600 TABLET ORAL EVERY 6 HOURS PRN
Qty: 35 TABLET | Refills: 1 | Status: SHIPPED | OUTPATIENT
Start: 2019-10-20 | End: 2021-02-22

## 2019-10-20 RX ADMIN — WITCH HAZEL 1 PAD: 500 SOLUTION RECTAL; TOPICAL at 11:19

## 2019-10-20 RX ADMIN — IBUPROFEN 600 MG: 600 TABLET ORAL at 09:34

## 2019-10-20 RX ADMIN — IBUPROFEN 600 MG: 600 TABLET ORAL at 04:01

## 2019-10-20 RX ADMIN — DOCUSATE SODIUM 100 MG: 100 CAPSULE, LIQUID FILLED ORAL at 09:34

## 2019-10-20 NOTE — PLAN OF CARE
Problem: Patient Care Overview  Goal: Plan of Care Review  Outcome: Ongoing (interventions implemented as appropriate)   10/20/19 0537   Plan of Care Review   Progress improving   OTHER   Outcome Summary VSS, breastfeeding, pain controlled with motrin. lochia small amt.     Goal: Individualization and Mutuality  Outcome: Ongoing (interventions implemented as appropriate)    Goal: Discharge Needs Assessment  Outcome: Ongoing (interventions implemented as appropriate)   10/20/19 0537   Discharge Needs Assessment   Patient/Family Anticipates Transition to home with family      10/20/19 0537   Discharge Needs Assessment   Patient/Family Anticipates Transition to home with family     Goal: Interprofessional Rounds/Family Conf  Outcome: Ongoing (interventions implemented as appropriate)   10/20/19 0537   Interdisciplinary Rounds/Family Conf   Participants patient;physician;nursing       Problem: Breastfeeding (Adult,Obstetrics,Pediatric)  Goal: Signs and Symptoms of Listed Potential Problems Will be Absent, Minimized or Managed (Breastfeeding)  Outcome: Ongoing (interventions implemented as appropriate)   10/20/19 0537   Goal/Outcome Evaluation   Problems Present (Breastfeeding) none       Problem: Postpartum (Vaginal Delivery) (Adult,Obstetrics,Pediatric)  Goal: Signs and Symptoms of Listed Potential Problems Will be Absent, Minimized or Managed (Postpartum)  Outcome: Ongoing (interventions implemented as appropriate)   10/20/19 0537   Goal/Outcome Evaluation   Problems Present (Postpartum Vag Deliv) none

## 2019-10-20 NOTE — DISCHARGE SUMMARY
Discharge Summary    Date of Admission: 10/18/2019  Date of Discharge:  10/20/2019      Patient: Charis Bull      MR#:[id number]    Delivery Provider: Marianna Pritchett     Discharge Surgeon/OB: Tracey De La Rosa    Presenting Problem/History of Present Illness  Pregnant [Z34.90]     Patient Active Problem List   Diagnosis   • Pregnant         Discharge Diagnosis: Vaginal delivery at 39w2d    Procedures:  Vaginal, Spontaneous     10/18/2019    5:14 PM        Discharge Date: 10/20/2019;     Hospital Course  Patient is a 39 y.o. female  at 39w2d status post vaginal delivery without complication. Postpartum the patient did well. She remained afebrile, with vital signs stable. She was ready for discharge on postpartum day 2. She has a history of anxiety and depression and requested going back on Zoloft before discharge.    Infant:   male  fetus 3490 g (7 lb 11.1 oz)  with Apgar scores of 8  , 9   at five minutes.    Condition on Discharge:  Stable    Vital Signs  Temp:  [98 °F (36.7 °C)-98.6 °F (37 °C)] 98.1 °F (36.7 °C)  Heart Rate:  [88-93] 93  Resp:  [16] 16  BP: (126-134)/(71-78) 134/78    Lab Results   Component Value Date    WBC 7.26 10/19/2019    HGB 10.8 (L) 10/19/2019    HCT 33.0 (L) 10/19/2019    MCV 87.5 10/19/2019     10/19/2019       Discharge Disposition  Home or Self Care    Discharge Medications     Discharge Medications      New Medications      Instructions Start Date   ibuprofen 600 MG tablet  Commonly known as:  ADVIL,MOTRIN   600 mg, Oral, Every 6 Hours PRN      sertraline 50 MG tablet  Commonly known as:  ZOLOFT   50 mg, Oral, Daily         Continue These Medications      Instructions Start Date   Prenatal 27-1 27-1 MG tablet tablet   1 tablet, Oral, Daily         Stop These Medications    cetirizine 5 MG tablet  Commonly known as:  zyrTEC            Discharge Diet:     Activity at Discharge:   Activity Instructions     Pelvic Rest            Follow-up Appointments  No future  appointments.  Additional Instructions for the Follow-ups that You Need to Schedule     Call MD for problems / concerns.   As directed      Discharge Follow-up with Specialty: tierra; 6 Weeks   As directed      Specialty:  tierra    Follow Up:  6 Weeks               Tracey De La Rosa MD  10/20/19  1:24 PM  Csd

## 2021-02-22 ENCOUNTER — OFFICE VISIT (OUTPATIENT)
Dept: OBSTETRICS AND GYNECOLOGY | Facility: CLINIC | Age: 41
End: 2021-02-22

## 2021-02-22 VITALS
WEIGHT: 205 LBS | SYSTOLIC BLOOD PRESSURE: 142 MMHG | BODY MASS INDEX: 30.36 KG/M2 | DIASTOLIC BLOOD PRESSURE: 72 MMHG | HEIGHT: 69 IN

## 2021-02-22 DIAGNOSIS — Z01.419 WOMEN'S ANNUAL ROUTINE GYNECOLOGICAL EXAMINATION: ICD-10-CM

## 2021-02-22 DIAGNOSIS — Z12.31 ENCOUNTER FOR SCREENING MAMMOGRAM FOR MALIGNANT NEOPLASM OF BREAST: Primary | ICD-10-CM

## 2021-02-22 PROCEDURE — 99386 PREV VISIT NEW AGE 40-64: CPT | Performed by: OBSTETRICS & GYNECOLOGY

## 2021-02-22 NOTE — PROGRESS NOTES
GYN Annual Exam     CC - Here for annual exam.     Subjective   HPI  Charis Bull is a 40 y.o. female, , who presents for annual well woman exam. Patient's last menstrual period was 02/15/2021..  Periods are regular every 25-35 days, lasting 6 days. The patient uses 1 of tampons/pads per hour., lasting 1 days.  Dysmenorrhea:mild, occurring first 1-2 days of flow.  Patient reports problems with: anxiety.  Partner Status: Marital Status: .  New Partners since last visit: no.  Desires STD Screening: no.  Patient has not had the HPV vaccine.     Additional OB/GYN History     Current contraception: contraceptive methods: Condoms  Desires to: continue contraception  Last Pap :   Last Completed Pap Smear       Status Date      PAP SMEAR Done 2019 neg, neg HPV        History of abnormal Pap smear: no  Family history of uterine, colon, breast, or ovarian cancer: no  Previous Mammogram :  no  Performs monthly Self-Breast Exam: yes  Exercises Regularly:yes  Feelings of Anxiety or Depression: yes - Anxiety. Stopped Zoloft in  but feels she needs to restart something  Tobacco Usage?: No   OB History        5    Para   3    Term   3       0    AB   2    Living   3       SAB   0    TAB   0    Ectopic   0    Molar   0    Multiple   0    Live Births   3                Health Maintenance   Topic Date Due   • Annual Gynecologic Pelvic and Breast Exam  1980   • ANNUAL PHYSICAL  1983   • TDAP/TD VACCINES (1 - Tdap) 1999   • INFLUENZA VACCINE  2020   • PAP SMEAR  2022   • HEPATITIS C SCREENING  Completed   • Pneumococcal Vaccine 0-64  Aged Out   • MENINGOCOCCAL VACCINE  Aged Out     [unfilled]      The additional following portions of the patient's history were reviewed and updated as appropriate: allergies, current medications, past family history, past medical history, past social history, past surgical history and problem list.    Review of Systems  "  Constitutional: Negative.    HENT: Negative.    Eyes: Negative.    Respiratory: Negative.    Cardiovascular: Negative.    Gastrointestinal: Negative.    Endocrine: Negative.    Genitourinary: Negative.    Musculoskeletal: Negative.    Skin: Negative.    Allergic/Immunologic: Negative.    Neurological: Negative.    Hematological: Negative.    Psychiatric/Behavioral: Negative.         Anxiety       I have reviewed and agree with the HPI, ROS, and historical information as entered above. Marianna Pritchett MD    Objective   /72   Ht 175.3 cm (69\")   Wt 93 kg (205 lb)   LMP 02/15/2021   BMI 30.27 kg/m²     Physical Exam  Vitals signs and nursing note reviewed. Exam conducted with a chaperone present.   Constitutional:       Appearance: She is well-developed.   HENT:      Head: Normocephalic and atraumatic.   Eyes:      Pupils: Pupils are equal, round, and reactive to light.   Neck:      Musculoskeletal: Normal range of motion. No muscular tenderness.      Thyroid: No thyroid mass or thyromegaly.   Pulmonary:      Effort: Pulmonary effort is normal. No retractions.   Chest:      Chest wall: No mass.      Breasts:         Right: Normal. No mass, nipple discharge, skin change or tenderness.         Left: Normal. No mass, nipple discharge, skin change or tenderness.   Abdominal:      General: Bowel sounds are normal.      Palpations: Abdomen is soft. Abdomen is not rigid. There is no mass.      Tenderness: There is no abdominal tenderness. There is no guarding.      Hernia: No hernia is present. There is no hernia in the left inguinal area or right inguinal area.   Genitourinary:     Exam position: Lithotomy position.      Pubic Area: No rash.       Labia:         Right: No rash, tenderness or lesion.         Left: No rash, tenderness or lesion.       Urethra: No urethral pain or urethral swelling.      Vagina: Normal. No vaginal discharge or lesions.      Cervix: No cervical motion tenderness, discharge, lesion or " cervical bleeding.      Uterus: Normal. Not enlarged, not fixed and not tender.       Adnexa:         Right: No mass, tenderness or fullness.          Left: No mass, tenderness or fullness.        Rectum: No external hemorrhoid.   Musculoskeletal:      Right lower leg: No edema.      Left lower leg: No edema.   Skin:     General: Skin is warm and dry.   Neurological:      Mental Status: She is alert and oriented to person, place, and time.      Motor: Motor function is intact.   Psychiatric:         Mood and Affect: Mood and affect normal.         Behavior: Behavior normal.         Assessment/Plan       Encounter Diagnoses   Name Primary?   • Encounter for screening mammogram for malignant neoplasm of breast Yes   • Women's annual routine gynecological examination        Plan     1. Recommended use of Vitamin D replacement and getting adequate calcium in her diet. (1500mg)  2. Reviewed monthly self breast exams.  Instructed to call with lumps, pain, or breast discharge.    3. Continue yearly mammography  4. Reviewed HPV guidelines.  5. Reviewed exercise as a preventative health measures.       Marianna Pritchett MD  02/22/2021

## 2021-05-12 ENCOUNTER — HOSPITAL ENCOUNTER (OUTPATIENT)
Dept: MAMMOGRAPHY | Facility: HOSPITAL | Age: 41
Discharge: HOME | End: 2021-05-12
Admitting: OBSTETRICS & GYNECOLOGY

## 2021-05-12 DIAGNOSIS — Z12.31 ENCOUNTER FOR SCREENING MAMMOGRAM FOR MALIGNANT NEOPLASM OF BREAST: ICD-10-CM

## 2021-05-12 PROCEDURE — 77063 BREAST TOMOSYNTHESIS BI: CPT

## 2021-05-12 PROCEDURE — 77067 SCR MAMMO BI INCL CAD: CPT | Performed by: RADIOLOGY

## 2021-05-12 PROCEDURE — 77063 BREAST TOMOSYNTHESIS BI: CPT | Performed by: RADIOLOGY

## 2021-05-12 PROCEDURE — 77067 SCR MAMMO BI INCL CAD: CPT

## 2022-04-14 ENCOUNTER — OFFICE VISIT (OUTPATIENT)
Dept: OBSTETRICS AND GYNECOLOGY | Facility: CLINIC | Age: 42
End: 2022-04-14

## 2022-04-14 VITALS
WEIGHT: 195 LBS | SYSTOLIC BLOOD PRESSURE: 120 MMHG | DIASTOLIC BLOOD PRESSURE: 80 MMHG | HEIGHT: 69 IN | BODY MASS INDEX: 28.88 KG/M2

## 2022-04-14 DIAGNOSIS — Z01.419 WELL WOMAN EXAM WITH ROUTINE GYNECOLOGICAL EXAM: Primary | ICD-10-CM

## 2022-04-14 PROBLEM — Z34.90 PREGNANT: Status: RESOLVED | Noted: 2019-10-18 | Resolved: 2022-04-14

## 2022-04-14 PROCEDURE — 99396 PREV VISIT EST AGE 40-64: CPT | Performed by: OBSTETRICS & GYNECOLOGY

## 2022-04-14 NOTE — PROGRESS NOTES
GYN Annual Exam     CC - Here for annual exam.     Subjective   HPI  Charis Bull is a 41 y.o. female, , who presents for annual well woman exam. Patient's last menstrual period was 2022..  Periods are regular lasting 7 days. They are heavy CD 1-3  She denies  dysmenorrhea. Partner Status: Marital Status: .  New Partners since last visit: no.  Desires STD Screening: no.      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   Last Completed Pap Smear          Ordered - PAP SMEAR (Every 3 Years) Ordered on 2019  Done - neg, neg HPV              History of abnormal Pap smear: no  Family history of uterine, colon, breast, or ovarian cancer: no  Previous Mammogram :  yes -   Performs monthly Self-Breast Exam: yes  Exercises Regularly:yes  Feelings of Anxiety or Depression: yes - zoloft for anxiety   Tobacco Usage?: No   OB History        5    Para   3    Term   3       0    AB   2    Living   3       SAB   0    IAB   0    Ectopic   0    Molar   0    Multiple   0    Live Births   3                Health Maintenance   Topic Date Due   • ANNUAL PHYSICAL  Never done   • TDAP/TD VACCINES (1 - Tdap) Never done   • Annual Gynecologic Pelvic and Breast Exam  2022   • INFLUENZA VACCINE  2022   • PAP SMEAR  2022   • HEPATITIS C SCREENING  Completed   • COVID-19 Vaccine  Completed   • Pneumococcal Vaccine 0-64  Aged Out     [unfilled]      The additional following portions of the patient's history were reviewed and updated as appropriate: allergies, current medications, past family history, past medical history, past social history, past surgical history and problem list.    Review of Systems   Constitutional: Negative.    HENT: Negative.    Respiratory: Negative.    Cardiovascular: Negative.   "  Gastrointestinal: Negative.    Genitourinary: Negative.    Musculoskeletal: Negative.    Skin: Negative.    Allergic/Immunologic: Negative.    Neurological: Negative.    Hematological: Negative.    Psychiatric/Behavioral: Negative.        I have reviewed and agree with the HPI, ROS, and historical information as entered above. Marianna Pritchett MD    Objective   /80   Ht 175.3 cm (69\")   Wt 88.5 kg (195 lb)   LMP 03/23/2022   Breastfeeding No   BMI 28.80 kg/m²     Physical Exam  Vitals and nursing note reviewed. Exam conducted with a chaperone present.   Constitutional:       Appearance: She is well-developed.   HENT:      Head: Normocephalic and atraumatic.   Eyes:      Pupils: Pupils are equal, round, and reactive to light.   Neck:      Thyroid: No thyroid mass or thyromegaly.   Pulmonary:      Effort: Pulmonary effort is normal. No retractions.   Chest:      Chest wall: No mass.   Breasts:      Right: Normal. No mass, nipple discharge, skin change or tenderness.      Left: Normal. No mass, nipple discharge, skin change or tenderness.       Abdominal:      General: Bowel sounds are normal.      Palpations: Abdomen is soft. Abdomen is not rigid. There is no mass.      Tenderness: There is no abdominal tenderness. There is no guarding.      Hernia: No hernia is present. There is no hernia in the left inguinal area or right inguinal area.   Genitourinary:     Exam position: Lithotomy position.      Pubic Area: No rash.       Labia:         Right: No rash, tenderness or lesion.         Left: No rash, tenderness or lesion.       Urethra: No urethral pain or urethral swelling.      Vagina: Normal. No vaginal discharge or lesions.      Cervix: No cervical motion tenderness, discharge, lesion or cervical bleeding.      Uterus: Normal. Not enlarged, not fixed and not tender.       Adnexa:         Right: No mass, tenderness or fullness.          Left: No mass, tenderness or fullness.        Rectum: No external " hemorrhoid.   Musculoskeletal:      Cervical back: Normal range of motion. No muscular tenderness.      Right lower leg: No edema.      Left lower leg: No edema.   Skin:     General: Skin is warm and dry.   Neurological:      Mental Status: She is alert and oriented to person, place, and time.      Motor: Motor function is intact.   Psychiatric:         Mood and Affect: Mood and affect normal.         Behavior: Behavior normal.         Assessment/Plan       Encounter Diagnosis   Name Primary?   • Well woman exam with routine gynecological exam Yes       Plan     1. Recommended use of Vitamin D replacement and getting adequate calcium in her diet. (1500mg)  2. Reviewed monthly self breast exams.  Instructed to call with lumps, pain, or breast discharge.    3. Continue yearly mammography  4. Reviewed HPV guidelines.  5. Reviewed exercise as a preventative health measures.       Marianna Pritchett MD  04/14/2022

## 2022-04-18 ENCOUNTER — TRANSCRIBE ORDERS (OUTPATIENT)
Dept: ADMINISTRATIVE | Facility: HOSPITAL | Age: 42
End: 2022-04-18

## 2022-04-18 DIAGNOSIS — Z12.31 VISIT FOR SCREENING MAMMOGRAM: Primary | ICD-10-CM

## 2022-04-26 DIAGNOSIS — Z01.419 WELL WOMAN EXAM WITH ROUTINE GYNECOLOGICAL EXAM: ICD-10-CM

## 2022-05-31 ENCOUNTER — OFFICE VISIT (OUTPATIENT)
Dept: OBSTETRICS AND GYNECOLOGY | Facility: CLINIC | Age: 42
End: 2022-05-31

## 2022-05-31 VITALS
SYSTOLIC BLOOD PRESSURE: 120 MMHG | DIASTOLIC BLOOD PRESSURE: 80 MMHG | WEIGHT: 195 LBS | HEIGHT: 69 IN | BODY MASS INDEX: 28.88 KG/M2

## 2022-05-31 DIAGNOSIS — R87.610 ASCUS WITH POSITIVE HIGH RISK HPV CERVICAL: ICD-10-CM

## 2022-05-31 DIAGNOSIS — R87.810 ASCUS WITH POSITIVE HIGH RISK HPV CERVICAL: ICD-10-CM

## 2022-05-31 DIAGNOSIS — R87.619 ABNORMAL CERVICAL PAPANICOLAOU SMEAR, UNSPECIFIED ABNORMAL PAP FINDING: Primary | ICD-10-CM

## 2022-05-31 LAB
B-HCG UR QL: NEGATIVE
EXPIRATION DATE: NORMAL
INTERNAL NEGATIVE CONTROL: NORMAL
INTERNAL POSITIVE CONTROL: NORMAL
Lab: NORMAL

## 2022-05-31 PROCEDURE — 57454 BX/CURETT OF CERVIX W/SCOPE: CPT | Performed by: OBSTETRICS & GYNECOLOGY

## 2022-05-31 PROCEDURE — 81025 URINE PREGNANCY TEST: CPT | Performed by: OBSTETRICS & GYNECOLOGY

## 2022-05-31 NOTE — PROGRESS NOTES
Colposcopy Procedure Note  Procedures    Indications: Charis Bull is a 41 y.o. female, , whose LMP was 2022.  She presents for follow up for evaluation of an abnormal PAP smear that showed ASCUS with POSITIVE high risk HPV non 16/18/45.  She understands the need for the procedure and is aware of the complications, including post-colposcopic vaginal bleeding, vaginal leukorrhea or cervicitis.  She is aware she may experience discomfort.  After being presented with the risk, benefits, and alternatives the patient wished to proceed.  She uses condoms for contraception. UPT today is negative.     Prior cervical treatment: no treatment. This is her first abnormal pap smear.     Procedure Details   The risks and benefits of the procedure and Verbal informed consent obtained.  She was positioned in the dorsal lithotomy position and a speculum was inserted into the vagina and excellent visualization of cervix achieved, cervix swabbed x 3 with acetic acid solution. The transformation zone was completely visualized.  A cervical biopsy was obtained at 6,9,12 o'clock.  An endocervical curettage was performed.  This colposcopy was satisfactory.     Findings:  The procedure was notable for:  Physical Exam    Specimens: cervical biopsies  Specimens labelled and sent to Pathology.    Complications: none.    Assessment and Plan    Problem List Items Addressed This Visit    None     Visit Diagnoses     Abnormal cervical Papanicolaou smear, unspecified abnormal pap finding    -  Primary    Relevant Orders    TISSUE EXAM, [facility] ([facility])    POC Pregnancy, Urine (Completed)    ASCUS with positive high risk HPV cervical              1. Will base further treatment on Pathology findings.  2. Treatment options discussed with patient.  3. Post biopsy instructions given to patient.      Marianna Pritchett MD  2022

## 2022-06-02 LAB — REF LAB TEST METHOD: NORMAL

## 2022-06-02 RX ORDER — FLUCONAZOLE 150 MG/1
150 TABLET ORAL ONCE
Qty: 1 TABLET | Refills: 0 | Status: SHIPPED | OUTPATIENT
Start: 2022-06-02 | End: 2022-06-02

## 2022-06-15 ENCOUNTER — HOSPITAL ENCOUNTER (OUTPATIENT)
Dept: MAMMOGRAPHY | Facility: HOSPITAL | Age: 42
Discharge: HOME | End: 2022-06-15
Admitting: OBSTETRICS & GYNECOLOGY

## 2022-06-15 DIAGNOSIS — Z12.31 VISIT FOR SCREENING MAMMOGRAM: ICD-10-CM

## 2022-06-15 PROCEDURE — 77067 SCR MAMMO BI INCL CAD: CPT

## 2022-06-15 PROCEDURE — 77063 BREAST TOMOSYNTHESIS BI: CPT

## 2022-06-15 PROCEDURE — 77067 SCR MAMMO BI INCL CAD: CPT | Performed by: RADIOLOGY

## 2022-06-15 PROCEDURE — 77063 BREAST TOMOSYNTHESIS BI: CPT | Performed by: RADIOLOGY

## 2022-07-18 ENCOUNTER — HOSPITAL ENCOUNTER (OUTPATIENT)
Dept: MAMMOGRAPHY | Facility: HOSPITAL | Age: 42
Discharge: HOME | End: 2022-07-18
Admitting: RADIOLOGY

## 2022-07-18 DIAGNOSIS — R92.8 ABNORMAL MAMMOGRAM: ICD-10-CM

## 2022-07-18 PROCEDURE — G0279 TOMOSYNTHESIS, MAMMO: HCPCS

## 2022-07-18 PROCEDURE — 77065 DX MAMMO INCL CAD UNI: CPT

## 2022-07-18 PROCEDURE — 77065 DX MAMMO INCL CAD UNI: CPT | Performed by: RADIOLOGY

## 2022-07-18 PROCEDURE — 77061 BREAST TOMOSYNTHESIS UNI: CPT | Performed by: RADIOLOGY

## 2022-08-18 ENCOUNTER — APPOINTMENT (OUTPATIENT)
Dept: MAMMOGRAPHY | Facility: HOSPITAL | Age: 42
End: 2022-08-18

## 2023-02-19 ENCOUNTER — APPOINTMENT (OUTPATIENT)
Dept: GENERAL RADIOLOGY | Facility: HOSPITAL | Age: 43
End: 2023-02-19
Payer: COMMERCIAL

## 2023-02-19 ENCOUNTER — HOSPITAL ENCOUNTER (EMERGENCY)
Facility: HOSPITAL | Age: 43
Discharge: HOME | End: 2023-02-19
Attending: EMERGENCY MEDICINE | Admitting: EMERGENCY MEDICINE
Payer: COMMERCIAL

## 2023-02-19 VITALS
OXYGEN SATURATION: 100 % | DIASTOLIC BLOOD PRESSURE: 91 MMHG | HEART RATE: 112 BPM | SYSTOLIC BLOOD PRESSURE: 122 MMHG | RESPIRATION RATE: 16 BRPM | HEIGHT: 69 IN | BODY MASS INDEX: 28.88 KG/M2 | WEIGHT: 195 LBS | TEMPERATURE: 98.2 F

## 2023-02-19 DIAGNOSIS — I47.1 SVT (SUPRAVENTRICULAR TACHYCARDIA): Primary | ICD-10-CM

## 2023-02-19 LAB
ALBUMIN SERPL-MCNC: 4.2 G/DL (ref 3.5–5.2)
ALBUMIN/GLOB SERPL: 1.6 G/DL
ALP SERPL-CCNC: 62 U/L (ref 39–117)
ALT SERPL W P-5'-P-CCNC: 8 U/L (ref 1–33)
ANION GAP SERPL CALCULATED.3IONS-SCNC: 10 MMOL/L (ref 5–15)
AST SERPL-CCNC: 14 U/L (ref 1–32)
BASOPHILS # BLD AUTO: 0.04 10*3/MM3 (ref 0–0.2)
BASOPHILS NFR BLD AUTO: 0.5 % (ref 0–1.5)
BILIRUB SERPL-MCNC: 0.6 MG/DL (ref 0–1.2)
BUN SERPL-MCNC: 10 MG/DL (ref 6–20)
BUN/CREAT SERPL: 15.2 (ref 7–25)
CALCIUM SPEC-SCNC: 9.1 MG/DL (ref 8.6–10.5)
CHLORIDE SERPL-SCNC: 110 MMOL/L (ref 98–107)
CO2 SERPL-SCNC: 23 MMOL/L (ref 22–29)
CREAT SERPL-MCNC: 0.66 MG/DL (ref 0.57–1)
D DIMER PPP FEU-MCNC: 0.31 MCGFEU/ML (ref 0–0.5)
DEPRECATED RDW RBC AUTO: 38 FL (ref 37–54)
EGFRCR SERPLBLD CKD-EPI 2021: 112.5 ML/MIN/1.73
EOSINOPHIL # BLD AUTO: 0.08 10*3/MM3 (ref 0–0.4)
EOSINOPHIL NFR BLD AUTO: 1.1 % (ref 0.3–6.2)
ERYTHROCYTE [DISTWIDTH] IN BLOOD BY AUTOMATED COUNT: 12.3 % (ref 12.3–15.4)
GLOBULIN UR ELPH-MCNC: 2.6 GM/DL
GLUCOSE SERPL-MCNC: 110 MG/DL (ref 65–99)
HCT VFR BLD AUTO: 39.9 % (ref 34–46.6)
HGB BLD-MCNC: 14 G/DL (ref 12–15.9)
HOLD SPECIMEN: NORMAL
IMM GRANULOCYTES # BLD AUTO: 0.02 10*3/MM3 (ref 0–0.05)
IMM GRANULOCYTES NFR BLD AUTO: 0.3 % (ref 0–0.5)
LIPASE SERPL-CCNC: 28 U/L (ref 13–60)
LYMPHOCYTES # BLD AUTO: 1.16 10*3/MM3 (ref 0.7–3.1)
LYMPHOCYTES NFR BLD AUTO: 15.3 % (ref 19.6–45.3)
MAGNESIUM SERPL-MCNC: 2 MG/DL (ref 1.6–2.6)
MCH RBC QN AUTO: 29.9 PG (ref 26.6–33)
MCHC RBC AUTO-ENTMCNC: 35.1 G/DL (ref 31.5–35.7)
MCV RBC AUTO: 85.3 FL (ref 79–97)
MONOCYTES # BLD AUTO: 0.48 10*3/MM3 (ref 0.1–0.9)
MONOCYTES NFR BLD AUTO: 6.3 % (ref 5–12)
NEUTROPHILS NFR BLD AUTO: 5.81 10*3/MM3 (ref 1.7–7)
NEUTROPHILS NFR BLD AUTO: 76.5 % (ref 42.7–76)
NRBC BLD AUTO-RTO: 0 /100 WBC (ref 0–0.2)
NT-PROBNP SERPL-MCNC: 35.4 PG/ML (ref 0–450)
PLATELET # BLD AUTO: 256 10*3/MM3 (ref 140–450)
PMV BLD AUTO: 9.1 FL (ref 6–12)
POTASSIUM SERPL-SCNC: 3.8 MMOL/L (ref 3.5–5.2)
PROT SERPL-MCNC: 6.8 G/DL (ref 6–8.5)
QT INTERVAL: 298 MS
QTC INTERVAL: 438 MS
RBC # BLD AUTO: 4.68 10*6/MM3 (ref 3.77–5.28)
SODIUM SERPL-SCNC: 143 MMOL/L (ref 136–145)
T4 FREE SERPL-MCNC: 1.01 NG/DL (ref 0.93–1.7)
TROPONIN T SERPL HS-MCNC: 16 NG/L
TSH SERPL DL<=0.05 MIU/L-ACNC: 3.37 UIU/ML (ref 0.27–4.2)
WBC NRBC COR # BLD: 7.59 10*3/MM3 (ref 3.4–10.8)
WHOLE BLOOD HOLD COAG: NORMAL
WHOLE BLOOD HOLD SPECIMEN: NORMAL

## 2023-02-19 PROCEDURE — 93005 ELECTROCARDIOGRAM TRACING: CPT | Performed by: EMERGENCY MEDICINE

## 2023-02-19 PROCEDURE — 83880 ASSAY OF NATRIURETIC PEPTIDE: CPT | Performed by: EMERGENCY MEDICINE

## 2023-02-19 PROCEDURE — 93005 ELECTROCARDIOGRAM TRACING: CPT

## 2023-02-19 PROCEDURE — 80050 GENERAL HEALTH PANEL: CPT | Performed by: EMERGENCY MEDICINE

## 2023-02-19 PROCEDURE — 99284 EMERGENCY DEPT VISIT MOD MDM: CPT

## 2023-02-19 PROCEDURE — 85379 FIBRIN DEGRADATION QUANT: CPT | Performed by: EMERGENCY MEDICINE

## 2023-02-19 PROCEDURE — 71045 X-RAY EXAM CHEST 1 VIEW: CPT

## 2023-02-19 PROCEDURE — 84484 ASSAY OF TROPONIN QUANT: CPT | Performed by: EMERGENCY MEDICINE

## 2023-02-19 PROCEDURE — 84439 ASSAY OF FREE THYROXINE: CPT | Performed by: EMERGENCY MEDICINE

## 2023-02-19 PROCEDURE — 83735 ASSAY OF MAGNESIUM: CPT | Performed by: EMERGENCY MEDICINE

## 2023-02-19 PROCEDURE — 83690 ASSAY OF LIPASE: CPT | Performed by: EMERGENCY MEDICINE

## 2023-02-19 RX ORDER — ASPIRIN 81 MG/1
324 TABLET, CHEWABLE ORAL ONCE
Status: DISCONTINUED | OUTPATIENT
Start: 2023-02-19 | End: 2023-02-19 | Stop reason: HOSPADM

## 2023-02-19 RX ORDER — SODIUM CHLORIDE 0.9 % (FLUSH) 0.9 %
10 SYRINGE (ML) INJECTION AS NEEDED
Status: DISCONTINUED | OUTPATIENT
Start: 2023-02-19 | End: 2023-02-19 | Stop reason: HOSPADM

## 2023-02-19 NOTE — DISCHARGE INSTRUCTIONS
"Look up \"vagal maneuvers\" for home-treatment options.  All maneuvers will be more effective with your legs in the air if you are able.  "

## 2023-02-19 NOTE — ED PROVIDER NOTES
Subjective   History of Present Illness    Pt presents with palpitations.  Onset earlier this morning at [employer], as she sat down in the pew.  Her Apple watch measured her HR at 220.  Lasted about 5 min and resolved.  During the episode she had a little chest heaviness but denies dyspnea, dizziness or nausea.  Never had this before.  Feels better now but has anxiety disorder and is a little anxious over the whole thing.    No current/recent illness, see ROS.  No recent travel, no leg pain/swelling.    History provided by:  Patient      Review of Systems   Constitutional: Negative for fever.   Respiratory: Negative for cough and shortness of breath.    Cardiovascular: Positive for chest pain and palpitations.   Gastrointestinal: Negative for abdominal pain and vomiting.   Neurological: Negative for light-headedness.   All other systems reviewed and are negative.      Past Medical History:   Diagnosis Date   • Abnormal Pap smear of cervix     ASCUS, hPV non 16/18/45+   • Anxiety        Allergies   Allergen Reactions   • Amoxicillin Rash     Patient stated she was young: reaction unknown   • Ceclor [Cefaclor] Rash     Patient stated she was young: reaction unknown   • Penicillins Rash     Patient stated she was young: reaction unknown       Past Surgical History:   Procedure Laterality Date   • D & C WITH SUCTION  05/2017    Was a D&E at - was 16 weeks   • DILATATION AND EVACUATION     • WISDOM TOOTH EXTRACTION         Family History   Problem Relation Age of Onset   • Hypertension Mother    • Thyroid disease Mother    • Diabetes Maternal Grandmother    • Breast cancer Neg Hx    • Ovarian cancer Neg Hx        Social History     Socioeconomic History   • Marital status:    Tobacco Use   • Smoking status: Never   • Smokeless tobacco: Never   Vaping Use   • Vaping Use: Never used   Substance and Sexual Activity   • Alcohol use: No   • Drug use: No   • Sexual activity: Yes     Partners: Male     Birth  control/protection: Condom           Objective   Physical Exam  Vitals and nursing note reviewed.   Constitutional:       General: She is not in acute distress.     Appearance: Normal appearance. She is not ill-appearing.   HENT:      Head: Normocephalic and atraumatic.      Mouth/Throat:      Mouth: Mucous membranes are moist.   Eyes:      General: No scleral icterus.        Right eye: No discharge.         Left eye: No discharge.      Conjunctiva/sclera: Conjunctivae normal.   Cardiovascular:      Rate and Rhythm: Regular rhythm. Tachycardia present.      Heart sounds: No murmur heard.  Pulmonary:      Effort: Pulmonary effort is normal. No respiratory distress.      Breath sounds: Normal breath sounds. No wheezing.   Abdominal:      General: Bowel sounds are normal. There is no distension.      Palpations: Abdomen is soft.      Tenderness: There is no abdominal tenderness. There is no guarding or rebound.   Musculoskeletal:         General: No swelling. Normal range of motion.      Cervical back: Normal range of motion and neck supple.      Right lower leg: No edema.      Left lower leg: No edema.   Skin:     General: Skin is warm and dry.      Findings: No rash.   Neurological:      General: No focal deficit present.      Mental Status: She is alert and oriented to person, place, and time. Mental status is at baseline.   Psychiatric:         Mood and Affect: Mood normal.         Behavior: Behavior normal.         Thought Content: Thought content normal.         Procedures           ED Course         EKG sinus tach.  CXR NAD.  Labs benign.  D-dimer negative.    Unable to say for certain without a tracing of her symptoms but her age, health status and the provided history is most consistent with PSVT.  Discussed this with pt in detail.  Will refer to [facility] clinic for followup and consideration of further workup.  She does not need to start meds for a first episode that lasted 5 minutes.  Discussed vagal maneuvers.   Discussed limit caffeine intake, get good hydration, good sleep.  Answered questions.                                  Medical Decision Making  SVT (supraventricular tachycardia) (HCC): acute illness or injury  Amount and/or Complexity of Data Reviewed  Labs: ordered. Decision-making details documented in ED Course.  Radiology: ordered. Decision-making details documented in ED Course.  ECG/medicine tests: ordered and independent interpretation performed. Decision-making details documented in ED Course.          Final diagnoses:   SVT (supraventricular tachycardia) (HCC)       ED Disposition  ED Disposition     ED Disposition   Discharge    Condition   Stable    Comment   --             [facility] CARDIOLOGY  [facility] [facility]  [address]  [facility] [address] [phone]    they will call you to schedule         Medication List      No changes were made to your prescriptions during this visit.          Kasi Lilly MD  02/19/23 8922

## 2023-03-20 ENCOUNTER — OFFICE VISIT (OUTPATIENT)
Dept: CARDIOLOGY | Facility: HOSPITAL | Age: 43
End: 2023-03-20
Payer: COMMERCIAL

## 2023-03-20 ENCOUNTER — HOSPITAL ENCOUNTER (OUTPATIENT)
Dept: CARDIOLOGY | Facility: HOSPITAL | Age: 43
Discharge: HOME | End: 2023-03-20
Payer: COMMERCIAL

## 2023-03-20 VITALS
HEIGHT: 69 IN | DIASTOLIC BLOOD PRESSURE: 75 MMHG | SYSTOLIC BLOOD PRESSURE: 130 MMHG | BODY MASS INDEX: 30.04 KG/M2 | RESPIRATION RATE: 18 BRPM | OXYGEN SATURATION: 97 % | HEART RATE: 94 BPM | TEMPERATURE: 98.8 F | WEIGHT: 202.8 LBS

## 2023-03-20 DIAGNOSIS — R00.2 PALPITATIONS: ICD-10-CM

## 2023-03-20 DIAGNOSIS — R00.2 PALPITATIONS: Primary | ICD-10-CM

## 2023-03-20 PROCEDURE — 99213 OFFICE O/P EST LOW 20 MIN: CPT | Performed by: NURSE PRACTITIONER

## 2023-03-20 NOTE — PROGRESS NOTES
Chief Complaint  Rapid Heart Rate and Establish Care     Subjective      History of Present Illness {CC  Problem List  Visit  Diagnosis   Encounters  Notes  Medications  Labs  Result Review Imaging  Media :23}     Charis Bull, 42 y.o. female with palpitations presents to [facility] Valve clinic for Rapid Heart Rate and Establish Care.     Patient was recently evaluated at [facility] emergency department for complaints of palpitations.  Emergency department work-up revealed electrolytes, CXR with no acute cardiopulmonary findings, and ECGs with no evidence of acute coronary syndrome.  Troponin was elevated during evaluation.  Patient was instructed to follow-up at [facility] heart and valve clinic for further evaluation.    Patient noting cute onset of palpitation episode approximately one month ago. She was just entering [employer] when she experienced tachypalpitations with associated lightheadedness, no syncope. Describes symptoms as rapid heartbeat.  No further pronounced tachypalpitation since emergency department evaluation.  Previous acute episode with duration of 5-10 minutes with heart rate reported at 220bpm on Apple Watch. Reports a little coffee and water that morning with some cereal; no recent diet/medicine change or illness. She is active with lifting/cardio 4-5 days per week; no exertional chest pain or tachypalpitation. Reports no previous cardiac workup for symptoms.    Previous tachypalpitations approximately 3 years ago.  Denies chest pain, dyspnea, near-syncope/syncope. Family history includes: father with atrial fibrillation/PVCs with ablation, no family history of early onset CAD.       Objective     Vital Signs:   Vitals:    03/20/23 1524 03/20/23 1525 03/20/23 1526   BP: 130/73 139/82 130/75   BP Location: Right arm Left arm Left arm   Patient Position: Sitting Standing Sitting   Cuff Size: Adult Adult Adult   Pulse: 89 112 94   Resp:   18   Temp: 98.8  "°F (37.1 °C) 98.8 °F (37.1 °C) 98.8 °F (37.1 °C)   TempSrc: Temporal Temporal Temporal   SpO2: 97% 98% 97%   Weight:   92 kg (202 lb 12.8 oz)   Height:   175.3 cm (69\")     Body mass index is 29.95 kg/m².  Physical Exam  Vitals and nursing note reviewed.   Constitutional:       Appearance: Normal appearance.   HENT:      Head: Normocephalic.   Eyes:      Extraocular Movements: Extraocular movements intact.   Neck:      Vascular: No carotid bruit.   Cardiovascular:      Rate and Rhythm: Normal rate and regular rhythm.      Pulses: Normal pulses.      Heart sounds: Normal heart sounds, S1 normal and S2 normal. No murmur heard.  Pulmonary:      Effort: Pulmonary effort is normal. No respiratory distress.      Breath sounds: Normal breath sounds.   Musculoskeletal:      Cervical back: Neck supple.      Right lower leg: No edema.      Left lower leg: No edema.   Skin:     General: Skin is warm and dry.   Neurological:      General: No focal deficit present.      Mental Status: She is alert.   Psychiatric:         Mood and Affect: Mood normal.         Behavior: Behavior normal.         Thought Content: Thought content normal.        Data Reviewed:{ Labs  Result Review  Imaging  Med Tab  Media :23}     ED Provider Notes by Kasi Lilly MD (02/19/2023 13:36)  Magnesium (02/19/2023 11:45)  T4, Free (02/19/2023 11:45)  TSH (02/19/2023 11:45)  D-dimer, Quantitative (02/19/2023 11:45)  BNP (02/19/2023 11:45)  Comprehensive Metabolic Panel (02/19/2023 11:45)  CBC & Differential (02/19/2023 11:45)  High Sensitivity Troponin T (02/19/2023 11:45)  XR Chest 1 View (02/19/2023 11:37)  ECG 12 Lead ED Triage Standing Order; Chest Pain (02/19/2023 11:25)      Assessment & Plan   Assessment and Plan {CC Problem List  Visit Diagnosis  ROS  Review (Popup)  Health Maintenance  Quality  BestPractice  Medications  SmartSets  SnapShot Encounters  Media :23}     1. Palpitations  -New onset tachypalpitation prompted ED " evaluation.   -ED work-up with ECG revealing sinus tachycardia, no acute arrhythmia  -Normal electrolytes, TSH in emergency department evaluation  -No recurrence of acute tachypalpitation since emergency department evaluation  -30-day MCOT for arrhythmia surveillance  -TTE for structural/functional evaluation  -Follow-up in approximately 6 weeks for reevaluation      Follow Up {Instructions Charge Capture  Follow-up Communications :23}     Return in about 6 weeks (around 5/1/2023) for Office follow-up, Monitor results, Results follow-up.        Patient was given instructions and counseling regarding her condition or for health maintenance advice. Please see specific information pulled into the AVS if appropriate.  Patient was instructed to call the [facility] with any questions, concerns, or worsening symptoms.    Dictated Utilizing Dragon Dictation   Please note that portions of this note were completed with a voice recognition program.   Part of this note may be an electronic transcription/translation of spoken language to printed text using the Dragon Dictation System.

## 2023-03-20 NOTE — PROGRESS NOTES
[facility] Heart Monitor Documentation    Charis Bull  1980  [id number]  03/20/23      [] ZIO XT Patch  Model [id number] Prescribed for N/A Days    · Serial Number: (N + 9 Digits) N   · Apply-By Date on Box:   · USPS Tracking Number:   · USPS Tracking        [] [employer]ian MINI PLUS Mobile Cardiac Telemetry  Model BGMINIPLUS Prescribed for 30 Days    · Serial Number: (BGM + 7 Digits) [id number]  · Shipped-By Date on Box: 149921  · UPS Tracking Number: [id number]  · UPS Tracking      [] [employer]ian MINI Holter Monitor  Model BGMINIEL Prescribed for N/A Days    · Serial Number: (7 Digits)   · Shipped-By Date on Box:   · UPS Tracking Number: [id number]  · UPS Tracking        This monitor was applied to the patient's chest and checked for proper functioning.  Ms. Charis Bull was instructed in the proper use of this monitor.  She was given the opportunity to ask questions and left the office with the device 's instruction manual.    Melania Tellez MA, 16:14 EDT, 03/20/23                  [facility] 8[facility]

## 2023-03-20 NOTE — PATIENT INSTRUCTIONS
- Office will call to schedule testing  - Office will call or send message with testing results

## 2023-04-06 ENCOUNTER — HOSPITAL ENCOUNTER (OUTPATIENT)
Dept: CARDIOLOGY | Facility: HOSPITAL | Age: 43
Discharge: HOME | End: 2023-04-06
Admitting: NURSE PRACTITIONER
Payer: COMMERCIAL

## 2023-04-06 VITALS — HEIGHT: 69 IN | BODY MASS INDEX: 29.92 KG/M2 | WEIGHT: 202 LBS

## 2023-04-06 DIAGNOSIS — R00.2 PALPITATIONS: ICD-10-CM

## 2023-04-06 PROCEDURE — 93306 TTE W/DOPPLER COMPLETE: CPT | Performed by: INTERNAL MEDICINE

## 2023-04-06 PROCEDURE — 93306 TTE W/DOPPLER COMPLETE: CPT

## 2023-04-07 LAB
ASCENDING AORTA: 3.1 CM
BH CV ECHO MEAS - AO MAX PG: 7.1 MMHG
BH CV ECHO MEAS - AO MEAN PG: 4 MMHG
BH CV ECHO MEAS - AO ROOT DIAM: 2.6 CM
BH CV ECHO MEAS - AO V2 MAX: 133 CM/SEC
BH CV ECHO MEAS - AO V2 VTI: 27.6 CM
BH CV ECHO MEAS - AVA(I,D): 2.7 CM2
BH CV ECHO MEAS - EDV(CUBED): 103.8 ML
BH CV ECHO MEAS - EDV(MOD-SP2): 59.9 ML
BH CV ECHO MEAS - EDV(MOD-SP4): 65 ML
BH CV ECHO MEAS - EF(MOD-BP): 62.5 %
BH CV ECHO MEAS - EF(MOD-SP2): 65.6 %
BH CV ECHO MEAS - EF(MOD-SP4): 58.9 %
BH CV ECHO MEAS - ESV(CUBED): 24.4 ML
BH CV ECHO MEAS - ESV(MOD-SP2): 20.6 ML
BH CV ECHO MEAS - ESV(MOD-SP4): 26.7 ML
BH CV ECHO MEAS - FS: 38.3 %
BH CV ECHO MEAS - IVS/LVPW: 1 CM
BH CV ECHO MEAS - IVSD: 0.9 CM
BH CV ECHO MEAS - LA DIMENSION: 3.7 CM
BH CV ECHO MEAS - LAT PEAK E' VEL: 19.7 CM/SEC
BH CV ECHO MEAS - LV DIASTOLIC VOL/BSA (35-75): 31.3 CM2
BH CV ECHO MEAS - LV MASS(C)D: 142.7 GRAMS
BH CV ECHO MEAS - LV MAX PG: 6.6 MMHG
BH CV ECHO MEAS - LV MEAN PG: 3 MMHG
BH CV ECHO MEAS - LV SYSTOLIC VOL/BSA (12-30): 12.9 CM2
BH CV ECHO MEAS - LV V1 MAX: 128 CM/SEC
BH CV ECHO MEAS - LV V1 VTI: 23.3 CM
BH CV ECHO MEAS - LVIDD: 4.7 CM
BH CV ECHO MEAS - LVIDS: 2.9 CM
BH CV ECHO MEAS - LVOT AREA: 3.1 CM2
BH CV ECHO MEAS - LVOT DIAM: 2 CM
BH CV ECHO MEAS - LVPWD: 0.9 CM
BH CV ECHO MEAS - MED PEAK E' VEL: 11.4 CM/SEC
BH CV ECHO MEAS - MV A MAX VEL: 54.8 CM/SEC
BH CV ECHO MEAS - MV DEC SLOPE: 327.5 CM/SEC2
BH CV ECHO MEAS - MV DEC TIME: 0.19 MSEC
BH CV ECHO MEAS - MV E MAX VEL: 71.3 CM/SEC
BH CV ECHO MEAS - MV E/A: 1.3
BH CV ECHO MEAS - MV MAX PG: 2.9 MMHG
BH CV ECHO MEAS - MV MEAN PG: 2 MMHG
BH CV ECHO MEAS - MV P1/2T: 79.9 MSEC
BH CV ECHO MEAS - MV V2 VTI: 28.4 CM
BH CV ECHO MEAS - MVA(P1/2T): 2.8 CM2
BH CV ECHO MEAS - MVA(VTI): 2.6 CM2
BH CV ECHO MEAS - PA ACC TIME: 0.17 SEC
BH CV ECHO MEAS - PA PR(ACCEL): 4.8 MMHG
BH CV ECHO MEAS - PA V2 MAX: 95.3 CM/SEC
BH CV ECHO MEAS - RAP SYSTOLE: 3 MMHG
BH CV ECHO MEAS - RVSP: 21.5 MMHG
BH CV ECHO MEAS - SI(MOD-SP2): 18.9 ML/M2
BH CV ECHO MEAS - SI(MOD-SP4): 18.5 ML/M2
BH CV ECHO MEAS - SV(LVOT): 73.2 ML
BH CV ECHO MEAS - SV(MOD-SP2): 39.3 ML
BH CV ECHO MEAS - SV(MOD-SP4): 38.3 ML
BH CV ECHO MEAS - TAPSE (>1.6): 1.47 CM
BH CV ECHO MEAS - TR MAX PG: 18.5 MMHG
BH CV ECHO MEAS - TR MAX VEL: 215 CM/SEC
BH CV ECHO MEASUREMENTS AVERAGE E/E' RATIO: 4.59
BH CV VAS BP RIGHT ARM: NORMAL MMHG
BH CV XLRA - RV BASE: 2.9 CM
BH CV XLRA - RV LENGTH: 5.8 CM
BH CV XLRA - RV MID: 2.6 CM
BH CV XLRA - TDI S': 10.1 CM/SEC
IVRT: 82 MSEC
LEFT ATRIUM VOLUME INDEX: 17.7 ML/M2
LV EF 2D ECHO EST: 60 %
MAXIMAL PREDICTED HEART RATE: 178 BPM
STRESS TARGET HR: 151 BPM

## 2023-05-01 ENCOUNTER — OFFICE VISIT (OUTPATIENT)
Dept: CARDIOLOGY | Facility: HOSPITAL | Age: 43
End: 2023-05-01
Payer: COMMERCIAL

## 2023-05-01 VITALS
SYSTOLIC BLOOD PRESSURE: 135 MMHG | HEART RATE: 87 BPM | DIASTOLIC BLOOD PRESSURE: 69 MMHG | BODY MASS INDEX: 29.8 KG/M2 | OXYGEN SATURATION: 97 % | WEIGHT: 201.2 LBS | RESPIRATION RATE: 16 BRPM | HEIGHT: 69 IN | TEMPERATURE: 98.5 F

## 2023-05-01 DIAGNOSIS — R00.2 PALPITATIONS: Primary | ICD-10-CM

## 2023-05-01 NOTE — PROGRESS NOTES
Chief Complaint  Rapid Heart Rate and Establish Care     Subjective      History of Present Illness {CC  Problem List  Visit  Diagnosis   Encounters  Notes  Medications  Labs  Result Review Imaging  Media :23}     Charis Bull, 42 y.o. female with palpitations presents to [facility] Heart and Valve clinic for Rapid Heart Rate and Establish Care.     Since previous evaluation patient completed MCOT. Preliminary data reveals no evidence of serious/critial events, no evidence of atrial fibrillation of pauses. Low burden PAC/PVC. Charis presents today feeling well overall from a cardiovascular standpoint. No further tachypalpitation or elevated heart rate on Apple Watch. Denies chest pain, dyspnea, palpitation, tachypalpitation, syncope.       Initial evaluation:  Patient was recently evaluated at [facility] emergency department for complaints of palpitations.  Emergency department work-up revealed electrolytes, CXR with no acute cardiopulmonary findings, and ECGs with no evidence of acute coronary syndrome.  Troponin was elevated during evaluation.  Patient was instructed to follow-up at [facility] heart and valve clinic for further evaluation.    Patient noting cute onset of palpitation episode approximately one month ago. She was just entering [employer] when she experienced tachypalpitations with associated lightheadedness, no syncope. Describes symptoms as rapid heartbeat.  No further pronounced tachypalpitation since emergency department evaluation.  Previous acute episode with duration of 5-10 minutes with heart rate reported at 220bpm on Apple Watch. Reports a little coffee and water that morning with some cereal; no recent diet/medicine change or illness. She is active with lifting/cardio 4-5 days per week; no exertional chest pain or tachypalpitation. Reports no previous cardiac workup for symptoms.  Previous tachypalpitations approximately 3 years ago.  Denies chest pain, dyspnea,  "near-syncope/syncope. Family history includes: father with atrial fibrillation/PVCs with ablation, no family history of early onset CAD.       Objective     Vital Signs:   Vitals:    05/01/23 1531   BP: 135/69   BP Location: Left arm   Patient Position: Sitting   Cuff Size: Adult   Pulse: 87   Resp: 16   Temp: 98.5 °F (36.9 °C)   TempSrc: Temporal   SpO2: 97%   Weight: 91.3 kg (201 lb 3.2 oz)   Height: 175.3 cm (69\")     Body mass index is 29.71 kg/m².  Physical Exam  Vitals and nursing note reviewed.   Constitutional:       Appearance: Normal appearance.   HENT:      Head: Normocephalic.   Eyes:      Extraocular Movements: Extraocular movements intact.   Neck:      Vascular: No carotid bruit.   Cardiovascular:      Rate and Rhythm: Normal rate and regular rhythm.      Pulses: Normal pulses.      Heart sounds: Normal heart sounds, S1 normal and S2 normal. No murmur heard.  Pulmonary:      Effort: Pulmonary effort is normal. No respiratory distress.      Breath sounds: Normal breath sounds.   Musculoskeletal:      Cervical back: Neck supple.      Right lower leg: No edema.      Left lower leg: No edema.   Skin:     General: Skin is warm and dry.   Neurological:      General: No focal deficit present.      Mental Status: She is alert.   Psychiatric:         Mood and Affect: Mood normal.         Behavior: Behavior normal.         Thought Content: Thought content normal.        Data Reviewed:{ Labs  Result Review  Imaging  Med Tab  Media :23}     ED Provider Notes by Kasi Lilly MD (02/19/2023 13:36)  Magnesium (02/19/2023 11:45)  T4, Free (02/19/2023 11:45)  TSH (02/19/2023 11:45)  D-dimer, Quantitative (02/19/2023 11:45)  BNP (02/19/2023 11:45)  Comprehensive Metabolic Panel (02/19/2023 11:45)  CBC & Differential (02/19/2023 11:45)  High Sensitivity Troponin T (02/19/2023 11:45)  XR Chest 1 View (02/19/2023 11:37)  ECG 12 Lead ED Triage Standing Order; Chest Pain (02/19/2023 11:25)      Assessment & Plan "   Assessment and Plan {CC Problem List  Visit Diagnosis  ROS  Review (Popup)  Health Maintenance  Quality  BestPractice  Medications  SmartSets  SnapShot Encounters  Media :23}     1. Palpitations  -New onset tachypalpitation prompted ED evaluation.   -No recurrent symptoms since previous evaluation.  -Normal electrolytes, TSH in emergency department evaluation  -Preliminary MCOT with no significant arrhtyhmia, low burden PAC/PVC  -TTE with normal LV systolic function, normal cardiac valve structure  -Given her previous tachypalpitation symptoms with elevated heart rate during ED evaluation will prescribe PRN metoprolol 25mg BID for recurrent tachypalpitation  -f/u in approximately 3 months for recheck    Follow Up {Instructions Charge Capture  Follow-up Communications :23}     Return in about 3 months (around 8/1/2023) for Office follow-up, Recheck.    Patient was given instructions and counseling regarding her condition or for health maintenance advice. Please see specific information pulled into the AVS if appropriate.  Patient was instructed to call the [facility] with any questions, concerns, or worsening symptoms.    Dictated Utilizing Dragon Dictation   Please note that portions of this note were completed with a voice recognition program.   Part of this note may be an electronic transcription/translation of spoken language to printed text using the Dragon Dictation System.

## 2023-06-01 DIAGNOSIS — R00.2 PALPITATIONS: ICD-10-CM

## 2023-06-01 NOTE — TELEPHONE ENCOUNTER
Last seen on 5/1/23 and will follow up on 8/1/23; sent refill for metoprolol tartrate 25mg twice daily as needed.     Charis Burciaga, PharmD

## 2023-06-13 ENCOUNTER — TRANSCRIBE ORDERS (OUTPATIENT)
Dept: ADMINISTRATIVE | Facility: HOSPITAL | Age: 43
End: 2023-06-13
Payer: COMMERCIAL

## 2023-06-13 DIAGNOSIS — Z12.31 VISIT FOR SCREENING MAMMOGRAM: Primary | ICD-10-CM

## 2023-07-24 ENCOUNTER — OFFICE VISIT (OUTPATIENT)
Dept: OBSTETRICS AND GYNECOLOGY | Facility: CLINIC | Age: 43
End: 2023-07-24
Payer: COMMERCIAL

## 2023-07-24 VITALS
HEIGHT: 69 IN | SYSTOLIC BLOOD PRESSURE: 116 MMHG | BODY MASS INDEX: 29.47 KG/M2 | WEIGHT: 199 LBS | DIASTOLIC BLOOD PRESSURE: 72 MMHG

## 2023-07-24 DIAGNOSIS — Z01.419 WOMEN'S ANNUAL ROUTINE GYNECOLOGICAL EXAMINATION: Primary | ICD-10-CM

## 2023-07-24 PROCEDURE — 99396 PREV VISIT EST AGE 40-64: CPT | Performed by: OBSTETRICS & GYNECOLOGY

## 2023-07-24 NOTE — PROGRESS NOTES
Gynecologic Annual Exam Note          GYN Annual Exam     Gynecologic Exam (Annual )        Subjective     HPI  Charis Bull is a 42 y.o. female, , who presents for annual well woman exam as a established patient . Patient's last menstrual period was 2023..  Patient reports problems with: weight gain. Patient isn't sure if weight gain could be hormonal or not. She has tried to exercise more and cutting back on sugar intake. Her periods occur every 25-35 days , lasting 6 days. The flow is heavy for the first 3 days then become lighter.. She reports dysmenorrhea is moderate, occurring first 1-2 days of flow. Partner Status: Marital Status: . She is is sexually active. She has not had new partners.. STD testing recommendations have been explained to the patient and she does not desire STD testing. There were no changes to her medical or surgical history since her last visit..     Mild dysplasia on colpo 1 year ago.       Additional OB/GYN History   Current contraception: contraceptive methods: Condoms  Desires to: do not start contraception    Last Pap : 2022. Result: ASCUS. HPV: HPV pool +.   Last Completed Pap Smear            Ordered - PAP SMEAR (Every 3 Years) Ordered on 2022  SCANNED - PAP SMEAR    2019  Done - neg, neg HPV                  History of abnormal Pap smear: yes - -ASCUS/HPV Pool +   Family history of uterine, colon, breast, or ovarian cancer: no  Performs monthly Self-Breast Exam: yes  Last mammogram: 22. Done at .    Last Completed Mammogram       This patient has no relevant Health Maintenance data.            History of abnormal mammogram: no    Colonoscopy: has never had a colonoscopy.  Exercises Regularly: yes  Feelings of Anxiety or Depression: yes - treated with medication  Tobacco Usage?: No       Current Outpatient Medications:     cetirizine (zyrTEC) 10 MG tablet, Take 1 tablet by mouth Daily., Disp: , Rfl:     metoprolol  tartrate (LOPRESSOR) 25 MG tablet, Take 1 tablet by mouth 2 (Two) Times a Day As Needed (rapid heartbeat/palpitation)., Disp: 30 tablet, Rfl: 3    sertraline (ZOLOFT) 50 MG tablet, Take 1 tablet by mouth Daily., Disp: 90 tablet, Rfl: 3     Patient denies the need for medication refills today.    OB History          5    Para   3    Term   3       0    AB   2    Living   3         SAB   0    IAB   0    Ectopic   0    Molar   0    Multiple   0    Live Births   3                Past Medical History:   Diagnosis Date    Abnormal Pap smear of cervix     ASCUS, hPV non 16/18/45+    Anxiety     SVT (supraventricular tachycardia)         Past Surgical History:   Procedure Laterality Date    D & C WITH SUCTION  2017    Was a D&E at - was 16 weeks    DILATATION AND EVACUATION      WISDOM TOOTH EXTRACTION         Health Maintenance   Topic Date Due    TDAP/TD VACCINES (1 - Tdap) Never done    ANNUAL PHYSICAL  Never done    COVID-19 Vaccine (4 - Pfizer series) 2022    INFLUENZA VACCINE  10/01/2023    Annual Gynecologic Pelvic and Breast Exam  2024    PAP SMEAR  2025    HEPATITIS C SCREENING  Completed    Pneumococcal Vaccine 0-64  Aged Out       The additional following portions of the patient's history were reviewed and updated as appropriate: allergies, current medications, past family history, past medical history, past social history, past surgical history, and problem list.    Review of Systems   Constitutional:  Positive for unexpected weight gain.   HENT: Negative.     Eyes: Negative.    Respiratory: Negative.     Cardiovascular: Negative.    Gastrointestinal: Negative.    Endocrine: Negative.    Genitourinary: Negative.    Musculoskeletal: Negative.    Skin: Negative.    Allergic/Immunologic: Negative.    Neurological: Negative.    Hematological: Negative.    Psychiatric/Behavioral: Negative.         I have reviewed and agree with the HPI, ROS, and historical information as entered  "above. Marianna Pritchett MD          Objective   /72   Ht 175.3 cm (69\")   Wt 90.3 kg (199 lb)   LMP 07/08/2023   BMI 29.39 kg/m²     Physical Exam  Vitals and nursing note reviewed. Exam conducted with a chaperone present.   Constitutional:       Appearance: She is well-developed.   HENT:      Head: Normocephalic and atraumatic.   Eyes:      Pupils: Pupils are equal, round, and reactive to light.   Neck:      Thyroid: No thyroid mass or thyromegaly.   Pulmonary:      Effort: Pulmonary effort is normal. No retractions.   Chest:      Chest wall: No mass.   Breasts:     Right: Normal. No mass, nipple discharge, skin change or tenderness.      Left: Normal. No mass, nipple discharge, skin change or tenderness.   Abdominal:      General: Bowel sounds are normal.      Palpations: Abdomen is soft. Abdomen is not rigid. There is no mass.      Tenderness: There is no abdominal tenderness. There is no guarding.      Hernia: No hernia is present. There is no hernia in the left inguinal area or right inguinal area.   Genitourinary:     Exam position: Lithotomy position.      Pubic Area: No rash.       Labia:         Right: No rash, tenderness or lesion.         Left: No rash, tenderness or lesion.       Urethra: No urethral pain or urethral swelling.      Vagina: Normal. No vaginal discharge or lesions.      Cervix: No cervical motion tenderness, discharge, lesion or cervical bleeding.      Uterus: Normal. Not enlarged, not fixed and not tender.       Adnexa:         Right: No mass, tenderness or fullness.          Left: No mass, tenderness or fullness.        Rectum: No external hemorrhoid.   Musculoskeletal:      Cervical back: Normal range of motion. No muscular tenderness.      Right lower leg: No edema.      Left lower leg: No edema.   Skin:     General: Skin is warm and dry.   Neurological:      Mental Status: She is alert and oriented to person, place, and time.      Motor: Motor function is intact.   Psychiatric: "         Mood and Affect: Mood and affect normal.         Behavior: Behavior normal.          Assessment and Plan    Problem List Items Addressed This Visit    None  Visit Diagnoses       Women's annual routine gynecological examination    -  Primary    Relevant Orders    LIQUID-BASED PAP SMEAR WITH HPV GENOTYPING REGARDLESS OF INTERPRETATION ([facility])            GYN annual well woman exam.   Discussed options for her heavy periods, she will consider and call if wants treatment  Pap guidelines reviewed.  Reviewed monthly self breast exams.  Instructed to call with lumps, pain, or breast discharge.    Reviewed exercise as a preventative health measures.   Reccommended Flu Vaccine in Fall of each year.  RTC in 1 year or PRN with problems.  Return in about 1 year (around 7/24/2024) for Annual physical.     Marianna Pritchett MD  07/24/2023

## 2023-07-25 LAB — REF LAB TEST METHOD: NORMAL

## 2023-07-28 ENCOUNTER — HOSPITAL ENCOUNTER (OUTPATIENT)
Dept: MAMMOGRAPHY | Facility: HOSPITAL | Age: 43
Discharge: HOME | End: 2023-07-28
Admitting: OBSTETRICS & GYNECOLOGY
Payer: COMMERCIAL

## 2023-07-28 DIAGNOSIS — Z12.31 VISIT FOR SCREENING MAMMOGRAM: ICD-10-CM

## 2023-07-28 PROCEDURE — 77063 BREAST TOMOSYNTHESIS BI: CPT

## 2023-07-28 PROCEDURE — 77067 SCR MAMMO BI INCL CAD: CPT

## 2023-08-01 ENCOUNTER — OFFICE VISIT (OUTPATIENT)
Dept: CARDIOLOGY | Facility: HOSPITAL | Age: 43
End: 2023-08-01
Payer: COMMERCIAL

## 2023-08-01 VITALS
BODY MASS INDEX: 29.8 KG/M2 | OXYGEN SATURATION: 98 % | HEART RATE: 67 BPM | DIASTOLIC BLOOD PRESSURE: 64 MMHG | TEMPERATURE: 97.7 F | HEIGHT: 69 IN | SYSTOLIC BLOOD PRESSURE: 108 MMHG | WEIGHT: 201.2 LBS | RESPIRATION RATE: 20 BRPM

## 2023-08-01 PROCEDURE — 99213 OFFICE O/P EST LOW 20 MIN: CPT | Performed by: NURSE PRACTITIONER

## 2024-03-01 ENCOUNTER — TELEMEDICINE (OUTPATIENT)
Dept: FAMILY MEDICINE CLINIC | Facility: TELEHEALTH | Age: 44
End: 2024-03-01
Payer: COMMERCIAL

## 2024-03-01 DIAGNOSIS — R39.89 SUSPECTED UTI: Primary | ICD-10-CM

## 2024-03-01 RX ORDER — LEVOTHYROXINE, LIOTHYRONINE 38; 9 UG/1; UG/1
TABLET ORAL
COMMUNITY
Start: 2024-01-23

## 2024-03-01 RX ORDER — NITROFURANTOIN 25; 75 MG/1; MG/1
100 CAPSULE ORAL 2 TIMES DAILY
Qty: 10 CAPSULE | Refills: 0 | Status: SHIPPED | OUTPATIENT
Start: 2024-03-01 | End: 2024-03-06

## 2024-03-01 RX ORDER — PROGESTERONE 200 MG/1
CAPSULE ORAL
COMMUNITY
Start: 2024-01-10

## 2024-03-01 NOTE — PROGRESS NOTES
Subjective   Chief Complaint   Patient presents with    Urinary Tract Infection       Charis Bull is a 43 y.o. female.     History of Present Illness  Patient reports urinary urgency, discomfort with urination that started last night.  Symptoms feel similar to the start of a UTI.  She is seeking treatment before symptoms worsen.  Urinary Tract Infection   This is a new problem. The current episode started yesterday. The problem has been unchanged. The quality of the pain is described as aching. There has been no fever. There is no history of pyelonephritis. Associated symptoms include urgency. Pertinent negatives include no chills, discharge, flank pain, frequency, hematuria, hesitancy, nausea, possible pregnancy, sweats or vomiting. She has tried nothing for the symptoms.        Allergies   Allergen Reactions    Amoxicillin Rash     Patient stated she was young: reaction unknown    Ceclor [Cefaclor] Rash     Patient stated she was young: reaction unknown    Penicillins Rash     Patient stated she was young: reaction unknown       Past Medical History:   Diagnosis Date    Abnormal Pap smear of cervix     ASCUS, hPV non 16/18/45+    Anxiety     SVT (supraventricular tachycardia)        Past Surgical History:   Procedure Laterality Date    D & C WITH SUCTION  05/2017    Was a D&E at - was 16 weeks    DILATATION AND EVACUATION      WISDOM TOOTH EXTRACTION         Social History     Socioeconomic History    Marital status:    Tobacco Use    Smoking status: Never     Passive exposure: Never    Smokeless tobacco: Never   Vaping Use    Vaping Use: Never used   Substance and Sexual Activity    Alcohol use: No    Drug use: No    Sexual activity: Yes     Partners: Male     Birth control/protection: Condom       Family History   Problem Relation Age of Onset    Hypertension Mother     Thyroid disease Mother     Hypertension Father     Atrial fibrillation Father     No Known Problems Brother     Heart disease  Maternal Uncle     Heart attack Maternal Uncle     Heart disease Paternal Aunt     Heart attack Paternal Aunt     Asthma Maternal Grandmother     Diabetes Maternal Grandmother     Heart attack Maternal Grandfather     Dementia Paternal Grandmother     Heart failure Paternal Grandfather     Breast cancer Neg Hx     Ovarian cancer Neg Hx          Current Outpatient Medications:     NP Thyroid 60 MG tablet, , Disp: , Rfl:     Progesterone (PROMETRIUM) 200 MG capsule, , Disp: , Rfl:     metoprolol tartrate (LOPRESSOR) 25 MG tablet, Take 1 tablet by mouth 2 (Two) Times a Day As Needed (rapid heartbeat/palpitation)., Disp: 30 tablet, Rfl: 3    nitrofurantoin, macrocrystal-monohydrate, (Macrobid) 100 MG capsule, Take 1 capsule by mouth 2 (Two) Times a Day for 5 days., Disp: 10 capsule, Rfl: 0      Review of Systems   Constitutional:  Negative for chills, diaphoresis, fatigue and fever.   Gastrointestinal:  Negative for abdominal distention, abdominal pain, nausea and vomiting.   Genitourinary:  Positive for dysuria and urgency. Negative for flank pain, frequency, hematuria, hesitancy, urinary incontinence, vaginal bleeding, vaginal discharge and vaginal pain.   Musculoskeletal:  Negative for back pain.        There were no vitals filed for this visit.    Objective   Physical Exam  Constitutional:       General: She is not in acute distress.     Appearance: Normal appearance. She is not ill-appearing, toxic-appearing or diaphoretic.   HENT:      Head: Normocephalic.      Mouth/Throat:      Lips: Pink.      Mouth: Mucous membranes are moist.   Pulmonary:      Effort: Pulmonary effort is normal.   Abdominal:      Tenderness: There is no abdominal tenderness (per pt).   Neurological:      Mental Status: She is alert and oriented to person, place, and time.          Procedures     Assessment & Plan   Diagnoses and all orders for this visit:    1. Suspected UTI (Primary)  -     nitrofurantoin, macrocrystal-monohydrate,  (Macrobid) 100 MG capsule; Take 1 capsule by mouth 2 (Two) Times a Day for 5 days.  Dispense: 10 capsule; Refill: 0    Wipe front to back, increase water to flush the kidneys and avoid bladder irritants such as caffeine and alcohol.    -Warning signs: severe abdominal/pelvic/back pain, fever >101, blood in urine - seek medical attention as soon as possible for a hands on/objective exam and possible labs.     If symptoms worsen or do not improve follow up with your PCP or visit your nearest Urgent Care Center or ER.        PLAN: Discussed dosing, side effects, recommended other symptomatic care.  Patient should follow up with primary care provider, Urgent Care or ER if symptoms worsen, fail to resolve or other symptoms need attention. Patient/family agree to the above.         YVONNE Merchant     The use of a video visit has been reviewed with the patient and verbal informed consent has been obtained. Myself and Charis Bull participated in this visit. The patient is located at [address] [place] [address]. I am located in [place], [place]. Mychart and Zoom were utilized.        This visit was performed via Telehealth.  This patient has been instructed to follow-up with their primary care provider if their symptoms worsen or the treatment provided does not resolve their illness.

## 2024-03-05 ENCOUNTER — E-VISIT (OUTPATIENT)
Dept: FAMILY MEDICINE CLINIC | Facility: TELEHEALTH | Age: 44
End: 2024-03-05
Payer: COMMERCIAL

## 2024-03-06 NOTE — E-VISIT TREATED
Chief Complaint: Yeast infection   Patient introduction   Patient is 43-year-old female presenting with 1 to 3 days of vaginal pruritus.   General presentation   No vaginal dryness, vaginal bleeding or spotting unrelated to menstruation, genital erythema, genital edema, darkening of genital skin, or vaginal pain.   Symptom onset was after menses.   Sexually active in the past 90 days. No new sex partner in previous 2 weeks. No treatment for an STI within the past 3 months.   No hormonal medication. Has not recently used douching products or products containing nonoxynol-9.   Recently used new medication. Has taken antibiotics in the last 2 weeks.   Positive history of vulvovaginal candidiasis, but no episodes in the previous 12 months. Current symptoms are similar to previous episodes of vulvovaginal candidiasis.   Has not tried any treatments for current symptoms.   No history of treatment for bacterial vaginosis.   Review of red flags/alarm symptoms:    No genital trauma    No genital sores    No abdominal or pelvic surgery within the last month    No fever    No vomiting    No abdominal pain    No retained foreign object in vagina    No treatment for an STI in the last 3 months    No sexual partner tested positive for an STI   Pregnancy/menstrual status/breastfeeding: Not pregnant. Not breastfeeding. Regarding date of last menstrual period, patient writes: 2 weeks ago .   Preferred medication route:   Prefers oral treatment option.   Current medications   Currently taking NP Thyroid 60 MG tablet, nitrofurantoin (macrocrystal-monohydrate) 100 MG capsule, Progesterone 200 MG capsule, iodine / potassium iodide Topical Solution [Lugols Strong Iodine], and VITAMIN A/VITAMIN D/VITAMIN E.   Medication allergies   No relevant drug allergies.   Medication contraindication review   None.   Past medical history   No diabetes mellitus.   Immune conditions: No immunocompromising conditions.   No history of cancer.   Social  history   Never smoked tobacco.   Patient-submitted comments   Patient was asked if they had anything to add about their symptoms. Patient writes: I started having symptoms for a UTI on Friday- and started medicine for it. It's better but now I'm itching! .   Patient did not request an excuse note.   Assessment   Vulvovaginal candidiasis.   This is the likely diagnosis based on patient's interview responses, including:    Symptom profile    Prior diagnosis of vulvovaginal candidiasis with similar symptoms    Recent oral antibiotic treatment   Plan   Medications:    fluconazole 150 mg tablet RX 150mg 1 tab PO once 1d as a single dose for vaginal yeast infection. Repeat in 72 hours if symptoms persist. Amount is 2 tab.   The patient's prescription will be sent to:   [facility][id number] [id number]   Phone: [phone]     Fax: [phone]   Education:    Condition and causes    Prevention    Treatment and self-care    When to call provider   Follow-up:   Patient to follow up as needed for progression or lack of improvement in symptoms within 7 to 10 days.   ----------   Electronically signed by YVONNE Booker FNP-BC on 2024-03-05 at 19:44PM   ----------   Patient Interview Transcript:   Which of these symptoms are bothering you? Select all that apply.    Itching in my vagina   Not selected:    Itching around my vagina    Burning around my vagina    Burning in my vagina    Vaginal discharge that looks different than usual    Fishy-smelling vaginal discharge    Pain during sex    Burning with urination    None of the above   How long have you had these symptoms? Select one.    1 to 3 days   Not selected:    Less than 24 hours    4 to 7 days    More than 7 days   Do you have any of these vaginal symptoms? Select all that apply.    None of the above   Not selected:    Pain    Dryness    Redness    Swelling    Darkening of the skin of the vulva    Unexpected bleeding or spotting between  periods or after menopause   Since your symptoms started, have you used a vaginal health screening kit to check the acidity (pH) of your vaginal discharge? These kits are available without a prescription at many drug stores and pharmacies. Common brands include Monistat Praccel Care Vaginal Health Test and Vagisil Screening Kit. Select one.    No   Not selected:    Yes   Have you noticed any sores on your genital area? This includes blisters or ulcers. Select one.    No   Not selected:    Yes   Along with your vaginal symptoms, have you had any of these symptoms? Select all that apply.    None of the above   Not selected:    Fever    Vomiting    Abdominal (stomach) pain   Before your symptoms began, did you have an injury to your genital area or vagina? Select one.    No   Not selected:    Yes   Could an object like a tampon or condom be stuck inside your vagina? Select one.    No   Not selected:    Yes   In the 2 weeks before your symptoms began, did you use either of these products? Select all that apply.    None of the above   Not selected:    Douching products    Products containing nonoxynol-9, a spermicide found in condoms, sponges, vaginal films, and jellies   In the week before your symptoms started, did any of these come into contact with your genital area? Select all that apply.    New medication   Not selected:    New soap    Underwear washed with a new laundry detergent    New sex toy    New cream or lotion    New perfume    New feminine or flushable wipe    Other new product (specify)    None of the above   Have you had a yeast infection before? Select one.    Yes, and my current symptoms feel the same as before   Not selected:    Yes, but my current symptoms feel different than before    No   How many yeast infections have you had in the last 12 months? Select one.    0   Not selected:    1 to 3    4 or more   Have you ever been treated for bacterial vaginosis (BV)? Select one.    No   Not selected:     Yes   In the past, have you developed yeast infections as a result of taking oral antibiotics? Select one.    Yes   Not selected:    No   Were you sexually active at any time in the last 3 months? Select one.    Yes   Not selected:    No   Have you had sex with a new partner in the last 2 weeks? Select one.    No   Not selected:    Yes   Have you had sex with 3 or more partners in the last 3 months? Select one.    No   Not selected:    Yes   In the last 3 months, were you treated for a sexually transmitted infection (STI)? Examples of STIs include chlamydia, gonorrhea, trichomoniasis (trich), herpes, or syphilis. Select one.    No   Not selected:    Yes   Has your sexual partner(s) recently tested positive for a sexually transmitted infection (STI)? Select all that apply.    No, not that I know of   Not selected:    Yes, chlamydia    Yes, gonorrhea    _Yes, trichomoniasis (trich) _    Other (specify)   Are you pregnant? Select one.    No   Not selected:    Yes   When was your last menstrual period? If you don't currently have periods or no longer have periods, please briefly explain.    __2 weeks ago __   Are you breastfeeding? Select one.    No   Not selected:    Yes   Did your symptoms start before, during, or after your menstrual period? Select one.    After   Not selected:    Before    During    I don't currently have periods or no longer have periods    I'm not sure   Have you recently had abdominal or pelvic surgery? Select one.    No   Not selected:    Yes, within the last month    Yes, within the last 3 months   Are you currently being treated for Type 1 or Type 2 diabetes? Select one.    No   Not selected:    Yes   Do you have any of these conditions that can affect the immune system? Scroll to see all options. Select all that apply.    None of these   Not selected:    History of bone marrow transplant    Chronic kidney disease    Chronic liver disease (including cirrhosis)    HIV/AIDS    Inflammatory  "bowel disease (Crohn's disease or ulcerative colitis)    Lupus    Moderate to severe plaque psoriasis    Multiple sclerosis    Rheumatoid arthritis    Sickle cell anemia    Alpha or beta thalassemia    History of kidney, liver, heart, or other solid organ transplant    History of liver, heart, or other solid organ transplant    History of spleen removal    An autoimmune disorder not listed here (specify)    A condition requiring treatment with long-term use of oral steroids (such as prednisone, prednisolone, or dexamethasone) (specify)   Have you ever been diagnosed with cancer? Select one.    No   Not selected:    Yes, I have cancer now    Yes, but I'm in remission   Have you been treated for antibiotic-associated colitis in the last month? This is also called \"C. diff colitis.\" It's commonly caused by the bacteria Clostridium difficile. Select one.    No   Not selected:    Yes   Have you ever been diagnosed with the heart condition known as prolonged QT interval or QT prolongation? Select one.    No   Not selected:    Yes   Do you smoke tobacco? Select one.    No, never   Not selected:    Yes, every day    Yes, some days    No, I quit   Have you tried any of these over-the-counter treatments for your current symptoms? Select all that apply.    I haven't tried anything for my symptoms   Not selected:    Vaginal cream, ointment, or suppository for yeast infection    Anti-itch cream or ointment containing hydrocortisone    Vaginal wash    Vaginal wipes, such as Summer's Estela    Homeopathic treatment    Other (specify)   Do you take or use any of these medications containing estrogen or progesterone? Select one.    None of the above   Not selected:    Birth control pills    Hormonal intrauterine device (IUD)    Contraceptive patch    Vaginal ring, such as NuvaRing    Birth control shot, such as Depo-Provera    Hormone replacement therapy (HRT), such as oral and topical medication, vaginal ring, and transdermal patch   " In the last 2 weeks, have you taken oral antibiotics? Oral antibiotics are medications that you take by mouth to treat a bacterial infection. Select one.    Yes   Not selected:    No   Are you still taking these medications listed in your medical record? If you're not taking any of these, click Next. Select all that apply.    NP Thyroid 60 MG tablet    nitrofurantoin (macrocrystal-monohydrate) 100 MG capsule    Progesterone 200 MG capsule   Are you taking any other medications, vitamins, or supplements? Select one.    Yes   Not selected:    No   Have you ever had an allergic or bad reaction to any medication? Select one.    Yes   Not selected:    No   Have you had an allergic or bad reaction to any of these medications? Select all that apply.    None of these   Not selected:    Cetirizine or levocetirizine (Xyzal, Zyrtec)    Clindamycin or lincomycin (Cleocin, ClindaMax, Clindesse, and Lincocin)    Clotrimazole (Gyne-Lotrimin)    Corticosteroids, such as hydrocortisone, triamcinolone, and clobetasol (Triderm, Oralone, and Temovate)    Diphenhydramine (Benadryl)    Fluconazole (Diflucan)    Hydroxyzine (Atarax, Vistaril)    Ibrexafungerp (Brexafemme)    Metronidazole (Flagyl, MetroGel Vaginal)    Miconazole (Monistat 1, Monistat 3, Monistat 7)    Tinidazole (Tindamax)    Tioconazole (Monistat 1-Day)   Have you used the medication disulfiram (Antabuse) in the last 2 weeks? Select one.    No   Not selected:    Yes   If medication is needed, would you prefer oral or vaginal medication? - Oral medications are pills that you swallow. - Vaginal medications are gels, creams, ointments, or suppositories that are placed in the vagina. We'll try to provide medication in the form you prefer, but that's not always possible. Select one.    Oral   Not selected:    Vaginal    No preference   Do you need a doctor's note? A doctor's note confirms that you received care today and states when you can return to school or work. It does  not contain information about your diagnosis or treatment plan. Your provider will make the final decision on whether to give you a doctor's note. Doctor's notes CANNOT be backdated. Select one.    No   Not selected:    Today only (1 day)    Today and tomorrow (2 days)    3 days   Is there anything you'd like to add about your symptoms? Please limit your comments to the symptoms covered in this interview. If you include comments about other concerns, your provider may recommend that you be seen in person.    __I started having symptoms for a UTI on Friday- and started medicine for it. It's better but now I'm itching! __   ----------   Medical history   Medical history data does not currently exist for this patient.

## 2024-03-06 NOTE — EXTERNAL PATIENT INSTRUCTIONS
Note   I have prescribed Diflucan. If your symptoms do not improve orbecome worse, follow up with your doctor.   Diagnosis   Yeast infection (vulvovaginal candidiasis)   My name is YVONNE Booker FNP-BC, and I'm a healthcare provider at [facility]. Based on your interview, I see you have a yeast infection. A yeast infection isn't considered a sexually transmitted infection (STI).   Medications   Your pharmacy   [facility]pharm[facility] #[id number] [id number] [phone]     Prescription   Fluconazole (150mg): Take 1 tablet by mouth once for 1 day as a single dose. If symptoms are still present after 3 days, you may take a second tablet.   About your diagnosis   Your vagina naturally and normally contains a balance of yeast and bacteria. When this balance is upset, an overgrowth of yeast can occur, causing the symptoms of a yeast infection. Antibiotic use, hormonal changes, poorly controlled diabetes, and stress can all affect the balance of organisms in the vagina.   Vaginal yeast infections are very common. In fact, about 3 in 4 women will have a yeast infection at some point in their lives. Yeast infection symptoms aren't usually serious.   What to expect   If you follow this treatment plan, you should feel better within 1 week.   When to seek care   Call us at [phone]   with any sudden or unexpected symptoms.    Symptoms that change or get worse.    Symptoms that don't go away even after completing your treatment plan.    A fever of 101F or higher.    Vaginal discharge with an unusual odor.    Frothy or foamy vaginal discharge.    Green or yellow vaginal discharge.    Spotting or bleeding unrelated to your period.    Severe abdominal cramping or pain.    Sores on your genital area.    Vomiting.   Other treatment   For vaginal itching or burning, apply a cold compress or washcloth.   Prevention    Wear cotton underwear. If you wear pantyhose or tights, choose brands with a cotton  crotch.    Wear loose-fitting clothes made from natural fibers.    Change out of workout clothes right after exercising.    After bathing, dry off completely before you get dressed.    Always wipe from front to back when you use the toilet.    Use only unscented and dye-free toilet paper.    Consider sleeping without underwear.    Use only unscented sanitary pads or tampons.    It's best to avoid douching products. Douching can increase the risk of vaginal infections, such as yeast infections.   Your provider   Your diagnosis was provided by YVONNE Booker, [facility]-BC, a member of your trusted care team at [facility].   If you have any questions, call us at [phone]  .

## 2024-06-12 ENCOUNTER — TRANSCRIBE ORDERS (OUTPATIENT)
Dept: OBSTETRICS AND GYNECOLOGY | Facility: CLINIC | Age: 44
End: 2024-06-12
Payer: COMMERCIAL

## 2024-06-12 DIAGNOSIS — Z12.31 ENCOUNTER FOR SCREENING MAMMOGRAM FOR BREAST CANCER: Primary | ICD-10-CM

## 2024-07-29 LAB
NCCN CRITERIA FLAG: NORMAL
TYRER CUZICK SCORE: 12.7

## 2024-08-03 ENCOUNTER — HOSPITAL ENCOUNTER (OUTPATIENT)
Dept: MAMMOGRAPHY | Facility: HOSPITAL | Age: 44
Discharge: HOME | End: 2024-08-03
Admitting: OBSTETRICS & GYNECOLOGY
Payer: COMMERCIAL

## 2024-08-03 DIAGNOSIS — Z12.31 ENCOUNTER FOR SCREENING MAMMOGRAM FOR BREAST CANCER: ICD-10-CM

## 2024-08-03 PROCEDURE — 77063 BREAST TOMOSYNTHESIS BI: CPT

## 2024-08-03 PROCEDURE — 77067 SCR MAMMO BI INCL CAD: CPT

## 2024-08-20 ENCOUNTER — OFFICE VISIT (OUTPATIENT)
Dept: OBSTETRICS AND GYNECOLOGY | Facility: CLINIC | Age: 44
End: 2024-08-20
Payer: COMMERCIAL

## 2024-08-20 VITALS
BODY MASS INDEX: 28.53 KG/M2 | DIASTOLIC BLOOD PRESSURE: 62 MMHG | HEIGHT: 69 IN | WEIGHT: 192.6 LBS | SYSTOLIC BLOOD PRESSURE: 104 MMHG

## 2024-08-20 DIAGNOSIS — Z23 NEED FOR HPV VACCINE: ICD-10-CM

## 2024-08-20 DIAGNOSIS — Z01.419 WOMEN'S ANNUAL ROUTINE GYNECOLOGICAL EXAMINATION: Primary | ICD-10-CM

## 2024-08-20 PROCEDURE — 99396 PREV VISIT EST AGE 40-64: CPT | Performed by: OBSTETRICS & GYNECOLOGY

## 2024-08-20 PROCEDURE — 90471 IMMUNIZATION ADMIN: CPT | Performed by: OBSTETRICS & GYNECOLOGY

## 2024-08-20 PROCEDURE — 90651 9VHPV VACCINE 2/3 DOSE IM: CPT | Performed by: OBSTETRICS & GYNECOLOGY

## 2024-08-20 RX ORDER — LIOTHYRONINE SODIUM 5 UG/1
5 TABLET ORAL DAILY
COMMUNITY

## 2024-08-20 NOTE — PROGRESS NOTES
First HPV Injection Note  Charis Bull is a 43 y.o. female here for counseling on HPV vaccination. Her LMP was 7/27/2024. She weighs 192lbs and she is 5feet 9inches tall. Charis Bull is sexually active. The patient's last pap smear was 7/24/2023. The results were normal.    She has received and reviewed the Gardasil Information Sheet. Contraindications have been reviewed.    Patient does not have cancer, leukemia, AIDS, or any other immune system problems. She has not received a blood transfusion, blood products, or immune gamma globulin in the last 6 months. She does not take cortisone, prednisone, steroid drugs, anticancer drugs, or had recent x-ray treatments. She has not received any vaccinations in the last 4 weeks. She has been counseled not to get pregnant in the next 6 months. She is using condoms for birth control.      Patient Questions  Have you been sick in the last 36 hours? No    Have you ever passed out from an injection? No     Have you ever had a reaction to a previous vaccination? No    Are you allergic to any medications? Yes amoxicillin, ceclor, penicillins    Are you allergic to any foods? No    Are you allergic to any vaccines? No    Have you been sick in the last month? No    Information Reviewed  Salma Martinez RN reviewed the possible side effects of pain, swelling, itching at the injection site, fever, difficulty breathing and raely anaphylactic reactions. The visit involved 15 minutes of counseling on STD prevention and vaccine benefits.    Injection Details  Noted in the Immunization Activity. Injection is office supplied. Noted in MAR.     Future Injections  Her next injection is in 2 months: 10/20/2024. The date of the third injection will be in 6 months: 2/20/2025    Toleration  Patient tolerated the injection well with no problems.    Electronically Signed By:  Salma Martinez RN  08/20/2024

## 2024-08-20 NOTE — PROGRESS NOTES
Gynecologic Annual Exam Note          GYN Annual Exam     Gynecologic Exam (Annual )        Subjective     HPI  Charis Bull is a 43 y.o. female, , who presents for annual well woman exam as a established patient. There were no changes to her medical or surgical history since her last visit..  Patient's last menstrual period was 2024 (exact date).  Her periods occur every 25-35 days, lasting 7-8 days.  The flow is moderate. She reports dysmenorrhea is mild occurring premenstrually. Marital Status: . She is sexually active. She has not had new partners.. STD testing recommendations have been explained to the patient and she declines STD testing.    The patient would like to discuss the following complaints today: Pt is asking if it is okay to get HPV vaccine, pt has history of HPV.     Additional OB/GYN History   contraceptive methods: Condoms  Desires to: do not start contraception  History of migraines: no    Last Pap : 2023. Result: negative. HPV: negative.   Last Completed Pap Smear            PAP SMEAR (Every 3 Years) Next due on 2023  LIQUID-BASED PAP SMEAR WITH HPV GENOTYPING REGARDLESS OF INTERPRETATION (JOHN,COR,MAD)    2022  SCANNED - PAP SMEAR    2019  Done - neg, neg HPV                  History of abnormal Pap smear: yes - -ASCUS/HPV Pool +   Family history of uterine, colon, breast, or ovarian cancer: no  Performs monthly Self-Breast Exam: yes  Last mammogram: 8/3/2024. Done at . There is a copy in the chart.    Last Completed Mammogram            MAMMOGRAM (Every 2 Years) Next due on 8/3/2026      2024  Mammo Screening Digital Tomosynthesis Bilateral With CAD    2023  Mammo Screening Digital Tomosynthesis Bilateral With CAD    2022  Mammo Diagnostic Digital Tomosynthesis Right With CAD    06/15/2022  Mammo Screening Digital Tomosynthesis Bilateral With CAD    2021  Mammo Screening Digital Tomosynthesis  Bilateral With CAD    Only the first 5 history entries have been loaded, but more history exists.                    Colonoscopy: has never had a colonoscopy or cologuard  Exercises Regularly: yes  Feelings of Anxiety or Depression: no  Tobacco Usage?: No       Current Outpatient Medications:     liothyronine (CYTOMEL) 5 MCG tablet, Take 1 tablet by mouth Daily., Disp: , Rfl:     metoprolol tartrate (LOPRESSOR) 25 MG tablet, Take 1 tablet by mouth 2 (Two) Times a Day As Needed (rapid heartbeat/palpitation)., Disp: 30 tablet, Rfl: 3    NP Thyroid 60 MG tablet, , Disp: , Rfl:     Progesterone (PROMETRIUM) 200 MG capsule, , Disp: , Rfl:      Patient denies the need for medication refills today.    OB History          5    Para   3    Term   3       0    AB   2    Living   3         SAB   0    IAB   0    Ectopic   0    Molar   0    Multiple   0    Live Births   3                Past Medical History:   Diagnosis Date    Abnormal Pap smear of cervix     ASCUS, hPV non 16/18/45+    Anxiety     SVT (supraventricular tachycardia)         Past Surgical History:   Procedure Laterality Date    D & C WITH SUCTION  2017    Was a D&E at - was 16 weeks    DILATATION AND EVACUATION      WISDOM TOOTH EXTRACTION         Health Maintenance   Topic Date Due    BMI FOLLOWUP  Never done    TDAP/TD VACCINES (1 - Tdap) Never done    ANNUAL PHYSICAL  Never done    COVID-19 Vaccine (4 - - season) 2023    Annual Gynecologic Pelvic and Breast Exam  2024    INFLUENZA VACCINE  2024    PAP SMEAR  2026    MAMMOGRAM  2026    HEPATITIS C SCREENING  Completed    Pneumococcal Vaccine 0-64  Aged Out       The additional following portions of the patient's history were reviewed and updated as appropriate: allergies, current medications, past family history, past medical history, past social history, past surgical history, and problem list.    Review of Systems   Constitutional: Negative.    HENT:  "Negative.     Eyes: Negative.    Respiratory: Negative.     Cardiovascular: Negative.    Gastrointestinal: Negative.    Endocrine: Negative.    Genitourinary: Negative.    Musculoskeletal: Negative.    Skin: Negative.    Allergic/Immunologic: Negative.    Neurological: Negative.    Hematological: Negative.    Psychiatric/Behavioral: Negative.           I have reviewed and agree with the HPI, ROS, and historical information as entered above. Marianna Pritchett MD          Objective   /62   Ht 175.3 cm (69\")   Wt 87.4 kg (192 lb 9.6 oz)   LMP 07/27/2024 (Exact Date)   BMI 28.44 kg/m²     Physical Exam  Vitals and nursing note reviewed. Exam conducted with a chaperone present.   Constitutional:       Appearance: She is well-developed.   HENT:      Head: Normocephalic and atraumatic.   Eyes:      Pupils: Pupils are equal, round, and reactive to light.   Neck:      Thyroid: No thyroid mass or thyromegaly.   Pulmonary:      Effort: Pulmonary effort is normal. No retractions.   Chest:      Chest wall: No mass.   Breasts:     Right: Normal. No mass, nipple discharge, skin change or tenderness.      Left: Normal. No mass, nipple discharge, skin change or tenderness.   Abdominal:      General: Bowel sounds are normal.      Palpations: Abdomen is soft. Abdomen is not rigid. There is no mass.      Tenderness: There is no abdominal tenderness. There is no guarding.      Hernia: No hernia is present. There is no hernia in the left inguinal area or right inguinal area.   Genitourinary:     Exam position: Lithotomy position.      Pubic Area: No rash.       Labia:         Right: No rash, tenderness or lesion.         Left: No rash, tenderness or lesion.       Urethra: No urethral pain or urethral swelling.      Vagina: Normal. No vaginal discharge or lesions.      Cervix: No cervical motion tenderness, discharge, lesion or cervical bleeding.      Uterus: Normal. Not enlarged, not fixed and not tender.       Adnexa:         " Right: No mass, tenderness or fullness.          Left: No mass, tenderness or fullness.        Rectum: No external hemorrhoid.   Musculoskeletal:      Cervical back: Normal range of motion. No muscular tenderness.      Right lower leg: No edema.      Left lower leg: No edema.   Skin:     General: Skin is warm and dry.   Neurological:      Mental Status: She is alert and oriented to person, place, and time.      Motor: Motor function is intact.   Psychiatric:         Mood and Affect: Mood and affect normal.         Behavior: Behavior normal.            Assessment and Plan    Problem List Items Addressed This Visit    None  Visit Diagnoses       Women's annual routine gynecological examination    -  Primary            GYN annual well woman exam.   Pap guidelines reviewed.  Reviewed monthly self breast exams.  Instructed to call with lumps, pain, or breast discharge.    Reviewed exercise as a preventative health measures.   RTC in 1 year or PRN with problems.  History of +HPV, desires gardasil today  Return in about 1 year (around 8/20/2025) for Annual physical.     Marianna Pritchett MD  08/20/2024

## 2025-06-25 ENCOUNTER — TRANSCRIBE ORDERS (OUTPATIENT)
Dept: ADMINISTRATIVE | Facility: HOSPITAL | Age: 45
End: 2025-06-25
Payer: COMMERCIAL

## 2025-06-25 DIAGNOSIS — Z12.31 VISIT FOR SCREENING MAMMOGRAM: Primary | ICD-10-CM

## 2025-08-03 LAB
NCCN CRITERIA FLAG: NORMAL
TYRER CUZICK SCORE: 12.5

## 2025-08-04 ENCOUNTER — HOSPITAL ENCOUNTER (OUTPATIENT)
Dept: MAMMOGRAPHY | Facility: HOSPITAL | Age: 45
Discharge: HOME | End: 2025-08-04
Admitting: OBSTETRICS & GYNECOLOGY
Payer: COMMERCIAL

## 2025-08-04 DIAGNOSIS — Z12.31 VISIT FOR SCREENING MAMMOGRAM: ICD-10-CM

## 2025-08-04 PROCEDURE — 77063 BREAST TOMOSYNTHESIS BI: CPT

## 2025-08-04 PROCEDURE — 77067 SCR MAMMO BI INCL CAD: CPT
